# Patient Record
Sex: MALE | Race: OTHER | NOT HISPANIC OR LATINO
[De-identification: names, ages, dates, MRNs, and addresses within clinical notes are randomized per-mention and may not be internally consistent; named-entity substitution may affect disease eponyms.]

---

## 2021-12-04 ENCOUNTER — TRANSCRIPTION ENCOUNTER (OUTPATIENT)
Age: 81
End: 2021-12-04

## 2021-12-14 ENCOUNTER — INPATIENT (INPATIENT)
Facility: HOSPITAL | Age: 81
LOS: 5 days | Discharge: NURSING, REHAB OR LONG-TERM CARE | DRG: 185 | End: 2021-12-20
Attending: SURGERY | Admitting: SURGERY
Payer: MEDICARE

## 2021-12-14 VITALS
RESPIRATION RATE: 18 BRPM | HEIGHT: 71 IN | DIASTOLIC BLOOD PRESSURE: 76 MMHG | WEIGHT: 164.91 LBS | SYSTOLIC BLOOD PRESSURE: 145 MMHG | HEART RATE: 63 BPM | TEMPERATURE: 98 F | OXYGEN SATURATION: 98 %

## 2021-12-14 DIAGNOSIS — S22.42XA MULTIPLE FRACTURES OF RIBS, LEFT SIDE, INITIAL ENCOUNTER FOR CLOSED FRACTURE: ICD-10-CM

## 2021-12-14 LAB
ALBUMIN SERPL ELPH-MCNC: 4.1 G/DL — SIGNIFICANT CHANGE UP (ref 3.3–5.2)
ALP SERPL-CCNC: 38 U/L — LOW (ref 40–120)
ALT FLD-CCNC: 30 U/L — SIGNIFICANT CHANGE UP
ANION GAP SERPL CALC-SCNC: 14 MMOL/L — SIGNIFICANT CHANGE UP (ref 5–17)
APTT BLD: 29.9 SEC — SIGNIFICANT CHANGE UP (ref 27.5–35.5)
AST SERPL-CCNC: 18 U/L — SIGNIFICANT CHANGE UP
BASOPHILS # BLD AUTO: 0.03 K/UL — SIGNIFICANT CHANGE UP (ref 0–0.2)
BASOPHILS NFR BLD AUTO: 0.3 % — SIGNIFICANT CHANGE UP (ref 0–2)
BILIRUB SERPL-MCNC: 0.3 MG/DL — LOW (ref 0.4–2)
BUN SERPL-MCNC: 19.7 MG/DL — SIGNIFICANT CHANGE UP (ref 8–20)
CALCIUM SERPL-MCNC: 8.8 MG/DL — SIGNIFICANT CHANGE UP (ref 8.6–10.2)
CHLORIDE SERPL-SCNC: 102 MMOL/L — SIGNIFICANT CHANGE UP (ref 98–107)
CO2 SERPL-SCNC: 22 MMOL/L — SIGNIFICANT CHANGE UP (ref 22–29)
CREAT SERPL-MCNC: 0.78 MG/DL — SIGNIFICANT CHANGE UP (ref 0.5–1.3)
EOSINOPHIL # BLD AUTO: 0.1 K/UL — SIGNIFICANT CHANGE UP (ref 0–0.5)
EOSINOPHIL NFR BLD AUTO: 1.1 % — SIGNIFICANT CHANGE UP (ref 0–6)
GLUCOSE SERPL-MCNC: 90 MG/DL — SIGNIFICANT CHANGE UP (ref 70–99)
HCT VFR BLD CALC: 33.2 % — LOW (ref 39–50)
HGB BLD-MCNC: 11.5 G/DL — LOW (ref 13–17)
IMM GRANULOCYTES NFR BLD AUTO: 0.4 % — SIGNIFICANT CHANGE UP (ref 0–1.5)
INR BLD: 1.07 RATIO — SIGNIFICANT CHANGE UP (ref 0.88–1.16)
LIDOCAIN IGE QN: 15 U/L — LOW (ref 22–51)
LYMPHOCYTES # BLD AUTO: 0.57 K/UL — LOW (ref 1–3.3)
LYMPHOCYTES # BLD AUTO: 6.3 % — LOW (ref 13–44)
MAGNESIUM SERPL-MCNC: 1.7 MG/DL — SIGNIFICANT CHANGE UP (ref 1.6–2.6)
MCHC RBC-ENTMCNC: 31.4 PG — SIGNIFICANT CHANGE UP (ref 27–34)
MCHC RBC-ENTMCNC: 34.6 GM/DL — SIGNIFICANT CHANGE UP (ref 32–36)
MCV RBC AUTO: 90.7 FL — SIGNIFICANT CHANGE UP (ref 80–100)
MONOCYTES # BLD AUTO: 0.48 K/UL — SIGNIFICANT CHANGE UP (ref 0–0.9)
MONOCYTES NFR BLD AUTO: 5.3 % — SIGNIFICANT CHANGE UP (ref 2–14)
NEUTROPHILS # BLD AUTO: 7.78 K/UL — HIGH (ref 1.8–7.4)
NEUTROPHILS NFR BLD AUTO: 86.6 % — HIGH (ref 43–77)
PHOSPHATE SERPL-MCNC: 3.4 MG/DL — SIGNIFICANT CHANGE UP (ref 2.4–4.7)
PLATELET # BLD AUTO: 198 K/UL — SIGNIFICANT CHANGE UP (ref 150–400)
POTASSIUM SERPL-MCNC: 3.9 MMOL/L — SIGNIFICANT CHANGE UP (ref 3.5–5.3)
POTASSIUM SERPL-SCNC: 3.9 MMOL/L — SIGNIFICANT CHANGE UP (ref 3.5–5.3)
PROT SERPL-MCNC: 6.1 G/DL — LOW (ref 6.6–8.7)
PROTHROM AB SERPL-ACNC: 12.4 SEC — SIGNIFICANT CHANGE UP (ref 10.6–13.6)
RBC # BLD: 3.66 M/UL — LOW (ref 4.2–5.8)
RBC # FLD: 11.9 % — SIGNIFICANT CHANGE UP (ref 10.3–14.5)
SARS-COV-2 RNA SPEC QL NAA+PROBE: SIGNIFICANT CHANGE UP
SODIUM SERPL-SCNC: 138 MMOL/L — SIGNIFICANT CHANGE UP (ref 135–145)
WBC # BLD: 9 K/UL — SIGNIFICANT CHANGE UP (ref 3.8–10.5)
WBC # FLD AUTO: 9 K/UL — SIGNIFICANT CHANGE UP (ref 3.8–10.5)

## 2021-12-14 PROCEDURE — 70450 CT HEAD/BRAIN W/O DYE: CPT | Mod: 26,MA

## 2021-12-14 PROCEDURE — 71250 CT THORAX DX C-: CPT | Mod: 26,MA

## 2021-12-14 PROCEDURE — 99285 EMERGENCY DEPT VISIT HI MDM: CPT

## 2021-12-14 PROCEDURE — 99223 1ST HOSP IP/OBS HIGH 75: CPT

## 2021-12-14 PROCEDURE — 72128 CT CHEST SPINE W/O DYE: CPT | Mod: 26,MA

## 2021-12-14 PROCEDURE — 72125 CT NECK SPINE W/O DYE: CPT | Mod: 26,MA

## 2021-12-14 PROCEDURE — 93010 ELECTROCARDIOGRAM REPORT: CPT

## 2021-12-14 RX ORDER — PANTOPRAZOLE SODIUM 20 MG/1
40 TABLET, DELAYED RELEASE ORAL
Refills: 0 | Status: DISCONTINUED | OUTPATIENT
Start: 2021-12-14 | End: 2021-12-15

## 2021-12-14 RX ORDER — TRAMADOL HYDROCHLORIDE 50 MG/1
25 TABLET ORAL EVERY 4 HOURS
Refills: 0 | Status: DISCONTINUED | OUTPATIENT
Start: 2021-12-14 | End: 2021-12-20

## 2021-12-14 RX ORDER — TRAMADOL HYDROCHLORIDE 50 MG/1
25 TABLET ORAL EVERY 6 HOURS
Refills: 0 | Status: DISCONTINUED | OUTPATIENT
Start: 2021-12-14 | End: 2021-12-14

## 2021-12-14 RX ORDER — ENOXAPARIN SODIUM 100 MG/ML
40 INJECTION SUBCUTANEOUS DAILY
Refills: 0 | Status: DISCONTINUED | OUTPATIENT
Start: 2021-12-14 | End: 2021-12-20

## 2021-12-14 RX ORDER — OXYCODONE AND ACETAMINOPHEN 5; 325 MG/1; MG/1
1 TABLET ORAL ONCE
Refills: 0 | Status: DISCONTINUED | OUTPATIENT
Start: 2021-12-14 | End: 2021-12-14

## 2021-12-14 RX ORDER — TRAMADOL HYDROCHLORIDE 50 MG/1
50 TABLET ORAL EVERY 4 HOURS
Refills: 0 | Status: DISCONTINUED | OUTPATIENT
Start: 2021-12-14 | End: 2021-12-20

## 2021-12-14 RX ORDER — KETOROLAC TROMETHAMINE 30 MG/ML
15 SYRINGE (ML) INJECTION EVERY 6 HOURS
Refills: 0 | Status: DISCONTINUED | OUTPATIENT
Start: 2021-12-14 | End: 2021-12-16

## 2021-12-14 RX ORDER — AMITRIPTYLINE HCL 25 MG
75 TABLET ORAL AT BEDTIME
Refills: 0 | Status: DISCONTINUED | OUTPATIENT
Start: 2021-12-14 | End: 2021-12-20

## 2021-12-14 RX ORDER — METHOCARBAMOL 500 MG/1
500 TABLET, FILM COATED ORAL EVERY 6 HOURS
Refills: 0 | Status: DISCONTINUED | OUTPATIENT
Start: 2021-12-14 | End: 2021-12-20

## 2021-12-14 RX ORDER — MORPHINE SULFATE 50 MG/1
4 CAPSULE, EXTENDED RELEASE ORAL ONCE
Refills: 0 | Status: DISCONTINUED | OUTPATIENT
Start: 2021-12-14 | End: 2021-12-14

## 2021-12-14 RX ORDER — IBUPROFEN 200 MG
400 TABLET ORAL EVERY 8 HOURS
Refills: 0 | Status: DISCONTINUED | OUTPATIENT
Start: 2021-12-14 | End: 2021-12-14

## 2021-12-14 RX ORDER — AMLODIPINE BESYLATE 2.5 MG/1
5 TABLET ORAL DAILY
Refills: 0 | Status: DISCONTINUED | OUTPATIENT
Start: 2021-12-14 | End: 2021-12-20

## 2021-12-14 RX ORDER — ACETAMINOPHEN 500 MG
650 TABLET ORAL EVERY 6 HOURS
Refills: 0 | Status: DISCONTINUED | OUTPATIENT
Start: 2021-12-14 | End: 2021-12-20

## 2021-12-14 RX ORDER — GABAPENTIN 400 MG/1
300 CAPSULE ORAL EVERY 8 HOURS
Refills: 0 | Status: DISCONTINUED | OUTPATIENT
Start: 2021-12-14 | End: 2021-12-20

## 2021-12-14 RX ORDER — ACETAMINOPHEN 500 MG
1000 TABLET ORAL ONCE
Refills: 0 | Status: COMPLETED | OUTPATIENT
Start: 2021-12-14 | End: 2021-12-14

## 2021-12-14 RX ORDER — ONDANSETRON 8 MG/1
4 TABLET, FILM COATED ORAL EVERY 6 HOURS
Refills: 0 | Status: DISCONTINUED | OUTPATIENT
Start: 2021-12-14 | End: 2021-12-20

## 2021-12-14 RX ORDER — TRAMADOL HYDROCHLORIDE 50 MG/1
50 TABLET ORAL EVERY 6 HOURS
Refills: 0 | Status: DISCONTINUED | OUTPATIENT
Start: 2021-12-14 | End: 2021-12-14

## 2021-12-14 RX ORDER — LIDOCAINE 4 G/100G
1 CREAM TOPICAL DAILY
Refills: 0 | Status: DISCONTINUED | OUTPATIENT
Start: 2021-12-14 | End: 2021-12-20

## 2021-12-14 RX ADMIN — TRAMADOL HYDROCHLORIDE 50 MILLIGRAM(S): 50 TABLET ORAL at 22:52

## 2021-12-14 RX ADMIN — GABAPENTIN 300 MILLIGRAM(S): 400 CAPSULE ORAL at 21:58

## 2021-12-14 RX ADMIN — TRAMADOL HYDROCHLORIDE 50 MILLIGRAM(S): 50 TABLET ORAL at 20:01

## 2021-12-14 RX ADMIN — AMLODIPINE BESYLATE 5 MILLIGRAM(S): 2.5 TABLET ORAL at 17:41

## 2021-12-14 RX ADMIN — METHOCARBAMOL 500 MILLIGRAM(S): 500 TABLET, FILM COATED ORAL at 22:52

## 2021-12-14 RX ADMIN — Medication 400 MILLIGRAM(S): at 23:15

## 2021-12-14 RX ADMIN — PANTOPRAZOLE SODIUM 40 MILLIGRAM(S): 20 TABLET, DELAYED RELEASE ORAL at 17:39

## 2021-12-14 RX ADMIN — TRAMADOL HYDROCHLORIDE 50 MILLIGRAM(S): 50 TABLET ORAL at 23:16

## 2021-12-14 RX ADMIN — MORPHINE SULFATE 4 MILLIGRAM(S): 50 CAPSULE, EXTENDED RELEASE ORAL at 15:42

## 2021-12-14 RX ADMIN — Medication 75 MILLIGRAM(S): at 21:58

## 2021-12-14 RX ADMIN — MORPHINE SULFATE 4 MILLIGRAM(S): 50 CAPSULE, EXTENDED RELEASE ORAL at 14:00

## 2021-12-14 RX ADMIN — Medication 400 MILLIGRAM(S): at 15:57

## 2021-12-14 RX ADMIN — Medication 400 MILLIGRAM(S): at 21:59

## 2021-12-14 RX ADMIN — METHOCARBAMOL 500 MILLIGRAM(S): 500 TABLET, FILM COATED ORAL at 17:39

## 2021-12-14 RX ADMIN — ENOXAPARIN SODIUM 40 MILLIGRAM(S): 100 INJECTION SUBCUTANEOUS at 17:39

## 2021-12-14 RX ADMIN — LIDOCAINE 1 PATCH: 4 CREAM TOPICAL at 15:59

## 2021-12-14 RX ADMIN — OXYCODONE AND ACETAMINOPHEN 1 TABLET(S): 5; 325 TABLET ORAL at 15:42

## 2021-12-14 RX ADMIN — Medication 650 MILLIGRAM(S): at 23:15

## 2021-12-14 RX ADMIN — Medication 650 MILLIGRAM(S): at 21:58

## 2021-12-14 RX ADMIN — OXYCODONE AND ACETAMINOPHEN 1 TABLET(S): 5; 325 TABLET ORAL at 13:26

## 2021-12-14 NOTE — ED PROVIDER NOTE - CLINICAL SUMMARY MEDICAL DECISION MAKING FREE TEXT BOX
PT presenting after mechanical fall with +head injury and back pain. Will ct h/n/c/t-spine, pain meds, reeval.

## 2021-12-14 NOTE — ED PROVIDER NOTE - ATTENDING CONTRIBUTION TO CARE
82 y/o M pt with hx of  shunt presenting today s/p fall.  STates that he lost his balance httihng his head; denies any loc, nausea or vomiting; pe awake alert in  nad heent ncat neck supple cor s1s 2lungs clear abd soft nontender neuro nonfocal dx head injury

## 2021-12-14 NOTE — ED PROVIDER NOTE - PROGRESS NOTE DETAILS
acute fractures noted, additional pain meds given, trauma cx. WIll order abg Reviewed all results with pt as well as plans for admission. Pt is comfortable with plan for admission. Questions answered. - Antolin Horan, PGY-3

## 2021-12-14 NOTE — ED PROVIDER NOTE - PHYSICAL EXAMINATION
Gen: no acute distress  Head: normocephalic, small hematoma to L occiput, no lacerations or bleeding  Lung: CTAB, no respiratory distress, no wheezing, rales, rhonchi  CV: normal s1/s2, rrr,   Abd: soft, no tenderness, non-distended  MSK: No edema, no visible deformities, full range of motion in all 4 extremities, tenderness to L paraspinal thoracic back , no midline c/t/l spine tenderness  Neuro: No focal neurologic deficits  Skin: No rash   Psych: normal affect

## 2021-12-14 NOTE — ED PROVIDER NOTE - OBJECTIVE STATEMENT
80 y/o M pt with hx of  shunt presenting today s/p fall.  STates that he lost his balance turning to the left, fell hitting the back of his head. 82 y/o M pt with hx of  shunt presenting today s/p fall.  STates that he lost his balance turning to the left, fell hitting the back of his head. Denies loc, currently is c/o back pain. No ac use. Pt denies any chest pain, dyspnea, fevers, n/v/d, abdominal pain, dysuria, cough, congestion, sore throat, neck pain, weakness, numbness, tingling, dizziness, syncope, or other complaint.

## 2021-12-14 NOTE — H&P ADULT - HISTORY OF PRESENT ILLNESS
81y Male BIB BLS/ALS for GLF, patient with brain tumor presence of  shunt, dementia who presents s/p GLF, mechanical, landing on left side, per wife multiple falls recently      A airway intact, C spine cleared, speaking full sentences  B equal breath sounds bilaterally  C radial/DP/femoral pulses intact bilaterally   D GCS15 [id number], moving all fours, no focal deficits, pupils R 3mm reactive/L 3mm reactive  E patient fully exposed, no gross deformities or bleeding, provided warm blankets          Initial vitals:   HR 60 /80  Secondary survey remarkable for left chest tenderness    ROS: 10-system review is otherwise negative except HPI above.      PAST MEDICAL & SURGICAL HISTORY:    FAMILY HISTORY:    [] Family history not pertinent as reviewed with the patient and family    SOCIAL HISTORY:  ***    ALLERGIES: Allergy Status Unknown      HOME MEDICATIONS: ***  amlodipine  amitriptiline  --------------------------------------------------------------------------------------------    PHYSICAL EXAM: ***  General: NAD, Lying in bed comfortably  Neuro: A+Ox2 (baseline, date)  HEENT: NC/AT, EOMI  Neck: Soft, supple  Cardio: RRR, nml S1/S2  Resp: Good effort, CTA b/l  Thorax: left posterior chest wall tenderness  Breast: No lesions/masses, no drainage  GI/Abd: Soft, NT/ND, no rebound/guarding, no masses palpated  Vascular: All 4 extremities warm.  Skin: Intact, no breakdown  Lymphatic/Nodes: No palpable lymphadenopathy  Pelvis: stable  Musculoskeletal: All 4 extremities moving spontaneously, no limitations, no spinal tenderness or stepoffs  --------------------------------------------------------------------------------------------    LABS                 11.5   9.00   )----------(  198       ( 14 Dec 2021 16:17 )               33.2      138    |  102    |  19.7   ----------------------------<  90         ( 14 Dec 2021 16:17 )  3.9     |  22.0   |  0.78     Ca    8.8        ( 14 Dec 2021 16:17 )  Phos  3.4       ( 14 Dec 2021 16:17 )  Mg     1.7       ( 14 Dec 2021 16:17 )    TPro  6.1    /  Alb  4.1    /  TBili  0.3    /  DBili  x      /  AST  18     /  ALT  30     /  AlkPhos  38     ( 14 Dec 2021 16:17 )    LIVER FUNCTIONS - ( 14 Dec 2021 16:17 )  Alb: 4.1 g/dL / Pro: 6.1 g/dL / ALK PHOS: 38 U/L / ALT: 30 U/L / AST: 18 U/L / GGT: x           PT/INR -  12.4 sec / 1.07 ratio   ( 14 Dec 2021 16:17 )       PTT -  29.9 sec   ( 14 Dec 2021 16:17 )  CAPILLARY BLOOD GLUCOSE              --------------------------------------------------------------------------------------------  IMAGING  CT head: No acute injuries  CT C-spine: no acute injuries    CT C: L 6-7, 9 rib fractures  CT T-spine: no acute injuries      ASSESSMENT: Patient is a 81y old male s/p GLF, left isde RIb fracrtures PIC 7 (IS 2000, Pain 9, Cough strong) will admit to  for pain control, pic protocol    PLAN:    - Reg  - ambulae  - Lov  - PIC protocol  - Pain control  - Discussed with Dr. White    Called at [phone]  Seen [phone]

## 2021-12-14 NOTE — ED ADULT TRIAGE NOTE - CHIEF COMPLAINT QUOTE
pt a+ox3, BIBA s/p fall from standing height. reports losing balance and falling backward, hit head and upper back on end table. denies LOC. hx brain shunt, MD @ bedside.

## 2021-12-14 NOTE — H&P ADULT - ATTENDING COMMENTS
Patient seen and examined in ED. Pt c/o 7/10 pain. Able to pull 1700 on IS. Requested to leave, agreeable to stay overnight for observation for pain control.

## 2021-12-14 NOTE — ED ADULT NURSE NOTE - OBJECTIVE STATEMENT
A&Ox3. sitting up in stretcher  significant other at bedside   presents to ED s/p fall, pt reports he hit to left side of his head and has pain to left shoulder, denies LOC   pmhx shunt to right side of head  pt placed on cardiac monitor  awaiting further MD eval and disposition A&Ox3 with periods of forgetfulness. as per pt wife, pt has dementia. sitting up in stretcher  significant other at bedside   presents to ED s/p fall, pt reports he hit to left side of his head and has pain to left shoulder, denies LOC   pmhx shunt to right side of head  pt placed on cardiac monitor  awaiting further MD eval and disposition

## 2021-12-15 ENCOUNTER — TRANSCRIPTION ENCOUNTER (OUTPATIENT)
Age: 81
End: 2021-12-15

## 2021-12-15 PROBLEM — Z00.00 ENCOUNTER FOR PREVENTIVE HEALTH EXAMINATION: Status: ACTIVE | Noted: 2021-12-15

## 2021-12-15 LAB
ANION GAP SERPL CALC-SCNC: 14 MMOL/L — SIGNIFICANT CHANGE UP (ref 5–17)
BASOPHILS # BLD AUTO: 0.02 K/UL — SIGNIFICANT CHANGE UP (ref 0–0.2)
BASOPHILS NFR BLD AUTO: 0.5 % — SIGNIFICANT CHANGE UP (ref 0–2)
BUN SERPL-MCNC: 18.5 MG/DL — SIGNIFICANT CHANGE UP (ref 8–20)
CALCIUM SERPL-MCNC: 8.6 MG/DL — SIGNIFICANT CHANGE UP (ref 8.6–10.2)
CHLORIDE SERPL-SCNC: 103 MMOL/L — SIGNIFICANT CHANGE UP (ref 98–107)
CO2 SERPL-SCNC: 24 MMOL/L — SIGNIFICANT CHANGE UP (ref 22–29)
CREAT SERPL-MCNC: 0.79 MG/DL — SIGNIFICANT CHANGE UP (ref 0.5–1.3)
EOSINOPHIL # BLD AUTO: 0.41 K/UL — SIGNIFICANT CHANGE UP (ref 0–0.5)
EOSINOPHIL NFR BLD AUTO: 10.1 % — HIGH (ref 0–6)
GLUCOSE SERPL-MCNC: 98 MG/DL — SIGNIFICANT CHANGE UP (ref 70–99)
HCT VFR BLD CALC: 33.8 % — LOW (ref 39–50)
HGB BLD-MCNC: 11.7 G/DL — LOW (ref 13–17)
IMM GRANULOCYTES NFR BLD AUTO: 0.5 % — SIGNIFICANT CHANGE UP (ref 0–1.5)
LYMPHOCYTES # BLD AUTO: 0.72 K/UL — LOW (ref 1–3.3)
LYMPHOCYTES # BLD AUTO: 17.8 % — SIGNIFICANT CHANGE UP (ref 13–44)
MAGNESIUM SERPL-MCNC: 2 MG/DL — SIGNIFICANT CHANGE UP (ref 1.6–2.6)
MCHC RBC-ENTMCNC: 31.5 PG — SIGNIFICANT CHANGE UP (ref 27–34)
MCHC RBC-ENTMCNC: 34.6 GM/DL — SIGNIFICANT CHANGE UP (ref 32–36)
MCV RBC AUTO: 91.1 FL — SIGNIFICANT CHANGE UP (ref 80–100)
MONOCYTES # BLD AUTO: 0.33 K/UL — SIGNIFICANT CHANGE UP (ref 0–0.9)
MONOCYTES NFR BLD AUTO: 8.1 % — SIGNIFICANT CHANGE UP (ref 2–14)
NEUTROPHILS # BLD AUTO: 2.55 K/UL — SIGNIFICANT CHANGE UP (ref 1.8–7.4)
NEUTROPHILS NFR BLD AUTO: 63 % — SIGNIFICANT CHANGE UP (ref 43–77)
PHOSPHATE SERPL-MCNC: 4.8 MG/DL — HIGH (ref 2.4–4.7)
PLATELET # BLD AUTO: 180 K/UL — SIGNIFICANT CHANGE UP (ref 150–400)
POTASSIUM SERPL-MCNC: 3.6 MMOL/L — SIGNIFICANT CHANGE UP (ref 3.5–5.3)
POTASSIUM SERPL-SCNC: 3.6 MMOL/L — SIGNIFICANT CHANGE UP (ref 3.5–5.3)
RBC # BLD: 3.71 M/UL — LOW (ref 4.2–5.8)
RBC # FLD: 12.1 % — SIGNIFICANT CHANGE UP (ref 10.3–14.5)
SODIUM SERPL-SCNC: 140 MMOL/L — SIGNIFICANT CHANGE UP (ref 135–145)
WBC # BLD: 4.05 K/UL — SIGNIFICANT CHANGE UP (ref 3.8–10.5)
WBC # FLD AUTO: 4.05 K/UL — SIGNIFICANT CHANGE UP (ref 3.8–10.5)

## 2021-12-15 PROCEDURE — 99232 SBSQ HOSP IP/OBS MODERATE 35: CPT

## 2021-12-15 RX ORDER — ACETAMINOPHEN 500 MG
2 TABLET ORAL
Qty: 0 | Refills: 0 | DISCHARGE
Start: 2021-12-15

## 2021-12-15 RX ORDER — TRAMADOL HYDROCHLORIDE 50 MG/1
0.5 TABLET ORAL
Qty: 9 | Refills: 0
Start: 2021-12-15 | End: 2021-12-17

## 2021-12-15 RX ORDER — POTASSIUM CHLORIDE 20 MEQ
40 PACKET (EA) ORAL ONCE
Refills: 0 | Status: COMPLETED | OUTPATIENT
Start: 2021-12-15 | End: 2021-12-15

## 2021-12-15 RX ORDER — GABAPENTIN 400 MG/1
1 CAPSULE ORAL
Qty: 15 | Refills: 0
Start: 2021-12-15 | End: 2021-12-19

## 2021-12-15 RX ADMIN — Medication 40 MILLIEQUIVALENT(S): at 08:28

## 2021-12-15 RX ADMIN — Medication 15 MILLIGRAM(S): at 12:30

## 2021-12-15 RX ADMIN — PANTOPRAZOLE SODIUM 40 MILLIGRAM(S): 20 TABLET, DELAYED RELEASE ORAL at 06:13

## 2021-12-15 RX ADMIN — GABAPENTIN 300 MILLIGRAM(S): 400 CAPSULE ORAL at 12:04

## 2021-12-15 RX ADMIN — Medication 650 MILLIGRAM(S): at 12:30

## 2021-12-15 RX ADMIN — Medication 15 MILLIGRAM(S): at 12:02

## 2021-12-15 RX ADMIN — Medication 15 MILLIGRAM(S): at 06:31

## 2021-12-15 RX ADMIN — ENOXAPARIN SODIUM 40 MILLIGRAM(S): 100 INJECTION SUBCUTANEOUS at 12:02

## 2021-12-15 RX ADMIN — METHOCARBAMOL 500 MILLIGRAM(S): 500 TABLET, FILM COATED ORAL at 12:01

## 2021-12-15 RX ADMIN — GABAPENTIN 300 MILLIGRAM(S): 400 CAPSULE ORAL at 22:08

## 2021-12-15 RX ADMIN — GABAPENTIN 300 MILLIGRAM(S): 400 CAPSULE ORAL at 06:13

## 2021-12-15 RX ADMIN — METHOCARBAMOL 500 MILLIGRAM(S): 500 TABLET, FILM COATED ORAL at 18:15

## 2021-12-15 RX ADMIN — LIDOCAINE 1 PATCH: 4 CREAM TOPICAL at 12:02

## 2021-12-15 RX ADMIN — Medication 15 MILLIGRAM(S): at 03:35

## 2021-12-15 RX ADMIN — METHOCARBAMOL 500 MILLIGRAM(S): 500 TABLET, FILM COATED ORAL at 06:13

## 2021-12-15 RX ADMIN — Medication 650 MILLIGRAM(S): at 12:01

## 2021-12-15 RX ADMIN — Medication 15 MILLIGRAM(S): at 18:15

## 2021-12-15 RX ADMIN — Medication 15 MILLIGRAM(S): at 01:58

## 2021-12-15 RX ADMIN — Medication 650 MILLIGRAM(S): at 08:26

## 2021-12-15 RX ADMIN — Medication 650 MILLIGRAM(S): at 22:07

## 2021-12-15 RX ADMIN — AMLODIPINE BESYLATE 5 MILLIGRAM(S): 2.5 TABLET ORAL at 06:13

## 2021-12-15 RX ADMIN — LIDOCAINE 1 PATCH: 4 CREAM TOPICAL at 03:35

## 2021-12-15 RX ADMIN — Medication 15 MILLIGRAM(S): at 06:13

## 2021-12-15 NOTE — PROGRESS NOTE ADULT - ASSESSMENT
Patient is an 80 y/o M s/p ground level fall with multiple left sided rib fractures  plan:  pic protocol  pain control  pulmonary toliet  incentive spirometer  continue home meds  regular diet  if patient continues to have increased pain with movement consider rib block

## 2021-12-15 NOTE — DISCHARGE NOTE PROVIDER - NSDCMRMEDTOKEN_GEN_ALL_CORE_FT
acetaminophen 325 mg oral tablet: 2 tab(s) orally every 6 hours  amitriptyline 75 mg oral tablet: 1 tab(s) orally once a day (at bedtime)  amLODIPine 5 mg oral tablet: 1 tab(s) orally once a day  gabapentin 300 mg oral capsule: 1 cap(s) orally every 8 hours  traMADol 50 mg oral tablet: 0.5 tab(s) orally every 4 hours, As needed, Moderate Pain (4 - 6) MDD:3 tabs   acetaminophen 325 mg oral tablet: 2 tab(s) orally every 6 hours  amitriptyline 75 mg oral tablet: 1 tab(s) orally once a day (at bedtime)  amLODIPine 5 mg oral tablet: 1 tab(s) orally once a day  gabapentin 300 mg oral capsule: 1 cap(s) orally every 8 hours  polyethylene glycol 3350 oral powder for reconstitution: 17 gram(s) orally 2 times a day  senna oral tablet: 2 tab(s) orally once a day (at bedtime)  sertraline 100 mg oral tablet: 2 tab(s) orally once a day in the morning  traMADol 50 mg oral tablet: 0.5 tab(s) orally every 4 hours, As needed, Moderate Pain (4 - 6) MDD:3 tabs

## 2021-12-15 NOTE — DISCHARGE NOTE NURSING/CASE MANAGEMENT/SOCIAL WORK - PATIENT PORTAL LINK FT
You can access the FollowMyHealth Patient Portal offered by [facility] by registering at the following website: [email or web address]. By joining "[employer]’s FollowMyHealth portal, you will also be able to view your health information using other applications (apps) compatible with our system.

## 2021-12-15 NOTE — DISCHARGE NOTE PROVIDER - NSDCCPCAREPLAN_GEN_ALL_CORE_FT
PRINCIPAL DISCHARGE DIAGNOSIS  Diagnosis: Multiple fractures of ribs of left side  Assessment and Plan of Treatment: Follow Up: Please call to make an appointment with your primary care physician as per your usual schedule.   Activity: May return to normal activities as tolerated, however refrain from heavy lifting >10-15 pounds. Continue to use incentive spirometer 10 times per hour to ensure adequate lung expansion.   Diet: May continue regular diet.  Medications: Please take all home medications as prescribed by your primary care doctor. Pain medication has been prescribed for you. Please take it as prescribed, do not drive or operate heavy machinery while taking narcotics. You are encouraged to take over-the-counter tylenol and/or ibuprofen for pain relief. 4% Lidocaine patches may be purchased over the counter for additional pain relief if needed.   Patient is advised to RETURN TO THE EMERGENCY DEPARTMENT for any of the following - worsening pain, fever/chills, nausea/vomiting, alterned mental status, chest pain, shortness of breath, or any other new/worsening symptoms.

## 2021-12-15 NOTE — DISCHARGE NOTE PROVIDER - NSFOLLOWUPCLINICS_GEN_ALL_ED_FT
[facility] Acute Care Surgery  Acute Care Surgery  [address] [place] [id number]  Phone: [phone]  Fax:

## 2021-12-15 NOTE — PHYSICAL THERAPY INITIAL EVALUATION ADULT - PERTINENT HX OF CURRENT PROBLEM, REHAB EVAL
81y Male BIB BLS/ALS for GLF, patient with brain tumor presence of  shunt, dementia who presents s/p GLF, mechanical, landing on left side, per wife multiple falls recently

## 2021-12-15 NOTE — DISCHARGE NOTE PROVIDER - HOSPITAL COURSE
HPI: 81y Male BIB BLS/ALS for GLF, patient with brain tumor presence of  shunt, dementia who presents s/p GLF, mechanical, landing on left side, per wife multiple falls recently.    Hospital Course: Imaging in ED showed traumatic injuries significant for Left 6, 7 and 9th rib fractures. Patient was admitted to the trauma service under Dr. Aggarwal for pain control. pic protocol was initiated with multi-modal pain regimen. pic score on admission was 7 (strong cough, 1500 IS, pain 9). Patient's pain improved with medications. Ambulatory without assistance. Voiding spontaneously. Tolerating regular diet well. Pain was well controlled at time of discharge. Patient was stable for discharge home with wife.      Length of time preparing discharge > 30 minutes   HPI: 81y Male BIB BLS/ALS for GLF, patient with brain tumor presence of  shunt, dementia who presents s/p GLF, mechanical, landing on left side, per wife multiple falls recently.    Hospital Course: Imaging in ED showed traumatic injuries significant for Left 6, 7 and 9th rib fractures. Patient was admitted to the trauma service under Dr. Aggarwal for pain control. pic protocol was initiated with multi-modal pain regimen. pic score on admission was 7 (strong cough, 1500 IS, pain 9). Patient's pain improved with medications. Ambulatory without assistance. Voiding spontaneously. Tolerating regular diet well. Pain was well controlled at time of discharge. Patient was stable for discharge home with wife.   Patient received pain blocked due to rib fractures. This procedure was done by pain management 12/20/21.

## 2021-12-15 NOTE — PHYSICAL THERAPY INITIAL EVALUATION ADULT - ADDITIONAL COMMENTS
pt states he lives with his wife in a 1st floor apartment with no steps to enter and none inside. pt has RW he uses at times.

## 2021-12-15 NOTE — DISCHARGE NOTE NURSING/CASE MANAGEMENT/SOCIAL WORK - NSDCPEFALRISK_GEN_ALL_CORE
For information on Fall & Injury Prevention, visit: [email or web address] OR  [email or web address] OR  https://www.cdc.gov/steadi/patient.html

## 2021-12-15 NOTE — DISCHARGE NOTE PROVIDER - NSDCCPTREATMENT_GEN_ALL_CORE_FT
PRINCIPAL PROCEDURE  Procedure: Block, nerve, intercostal  Findings and Treatment: Done by pain management on th ele chest

## 2021-12-16 PROCEDURE — 99232 SBSQ HOSP IP/OBS MODERATE 35: CPT

## 2021-12-16 RX ORDER — INFLUENZA VIRUS VACCINE 15; 15; 15; 15 UG/.5ML; UG/.5ML; UG/.5ML; UG/.5ML
0.7 SUSPENSION INTRAMUSCULAR ONCE
Refills: 0 | Status: DISCONTINUED | OUTPATIENT
Start: 2021-12-16 | End: 2021-12-20

## 2021-12-16 RX ADMIN — METHOCARBAMOL 500 MILLIGRAM(S): 500 TABLET, FILM COATED ORAL at 22:28

## 2021-12-16 RX ADMIN — Medication 15 MILLIGRAM(S): at 06:31

## 2021-12-16 RX ADMIN — LIDOCAINE 1 PATCH: 4 CREAM TOPICAL at 00:00

## 2021-12-16 RX ADMIN — LIDOCAINE 1 PATCH: 4 CREAM TOPICAL at 15:13

## 2021-12-16 RX ADMIN — METHOCARBAMOL 500 MILLIGRAM(S): 500 TABLET, FILM COATED ORAL at 06:31

## 2021-12-16 RX ADMIN — Medication 650 MILLIGRAM(S): at 04:12

## 2021-12-16 RX ADMIN — TRAMADOL HYDROCHLORIDE 50 MILLIGRAM(S): 50 TABLET ORAL at 21:34

## 2021-12-16 RX ADMIN — Medication 15 MILLIGRAM(S): at 01:30

## 2021-12-16 RX ADMIN — Medication 650 MILLIGRAM(S): at 05:02

## 2021-12-16 RX ADMIN — Medication 15 MILLIGRAM(S): at 07:04

## 2021-12-16 RX ADMIN — TRAMADOL HYDROCHLORIDE 50 MILLIGRAM(S): 50 TABLET ORAL at 15:17

## 2021-12-16 RX ADMIN — METHOCARBAMOL 500 MILLIGRAM(S): 500 TABLET, FILM COATED ORAL at 17:51

## 2021-12-16 RX ADMIN — GABAPENTIN 300 MILLIGRAM(S): 400 CAPSULE ORAL at 15:13

## 2021-12-16 RX ADMIN — Medication 650 MILLIGRAM(S): at 16:20

## 2021-12-16 RX ADMIN — Medication 650 MILLIGRAM(S): at 20:34

## 2021-12-16 RX ADMIN — Medication 650 MILLIGRAM(S): at 21:34

## 2021-12-16 RX ADMIN — GABAPENTIN 300 MILLIGRAM(S): 400 CAPSULE ORAL at 20:35

## 2021-12-16 RX ADMIN — ENOXAPARIN SODIUM 40 MILLIGRAM(S): 100 INJECTION SUBCUTANEOUS at 12:34

## 2021-12-16 RX ADMIN — AMLODIPINE BESYLATE 5 MILLIGRAM(S): 2.5 TABLET ORAL at 06:30

## 2021-12-16 RX ADMIN — Medication 650 MILLIGRAM(S): at 10:31

## 2021-12-16 RX ADMIN — TRAMADOL HYDROCHLORIDE 50 MILLIGRAM(S): 50 TABLET ORAL at 16:15

## 2021-12-16 RX ADMIN — Medication 75 MILLIGRAM(S): at 22:27

## 2021-12-16 RX ADMIN — Medication 650 MILLIGRAM(S): at 09:34

## 2021-12-16 RX ADMIN — METHOCARBAMOL 500 MILLIGRAM(S): 500 TABLET, FILM COATED ORAL at 01:05

## 2021-12-16 RX ADMIN — Medication 650 MILLIGRAM(S): at 15:24

## 2021-12-16 RX ADMIN — GABAPENTIN 300 MILLIGRAM(S): 400 CAPSULE ORAL at 06:31

## 2021-12-16 RX ADMIN — Medication 75 MILLIGRAM(S): at 01:05

## 2021-12-16 RX ADMIN — TRAMADOL HYDROCHLORIDE 50 MILLIGRAM(S): 50 TABLET ORAL at 20:34

## 2021-12-16 RX ADMIN — LIDOCAINE 1 PATCH: 4 CREAM TOPICAL at 18:19

## 2021-12-16 RX ADMIN — Medication 15 MILLIGRAM(S): at 01:08

## 2021-12-16 RX ADMIN — METHOCARBAMOL 500 MILLIGRAM(S): 500 TABLET, FILM COATED ORAL at 12:34

## 2021-12-16 NOTE — PATIENT PROFILE ADULT - FALL HARM RISK - HARM RISK INTERVENTIONS

## 2021-12-16 NOTE — PROGRESS NOTE ADULT - ASSESSMENT
Patient is an 80 y/o M s/p ground level fall with multiple left sided rib fractures  plan:  pic protocol  pain control  pulmonary toliet  incentive spirometer  continue home meds  regular diet  if patient continues to have increased pain with movement consider rib block   patient seen by PT for unsteady needs more work for dispo

## 2021-12-17 PROCEDURE — 99232 SBSQ HOSP IP/OBS MODERATE 35: CPT

## 2021-12-17 RX ORDER — DONEPEZIL HYDROCHLORIDE 10 MG/1
10 TABLET, FILM COATED ORAL DAILY
Refills: 0 | Status: DISCONTINUED | OUTPATIENT
Start: 2021-12-17 | End: 2021-12-20

## 2021-12-17 RX ORDER — MEMANTINE HYDROCHLORIDE 10 MG/1
20 TABLET ORAL
Refills: 0 | Status: DISCONTINUED | OUTPATIENT
Start: 2021-12-18 | End: 2021-12-20

## 2021-12-17 RX ADMIN — Medication 650 MILLIGRAM(S): at 21:16

## 2021-12-17 RX ADMIN — TRAMADOL HYDROCHLORIDE 25 MILLIGRAM(S): 50 TABLET ORAL at 04:00

## 2021-12-17 RX ADMIN — LIDOCAINE 1 PATCH: 4 CREAM TOPICAL at 23:42

## 2021-12-17 RX ADMIN — METHOCARBAMOL 500 MILLIGRAM(S): 500 TABLET, FILM COATED ORAL at 05:15

## 2021-12-17 RX ADMIN — Medication 650 MILLIGRAM(S): at 17:17

## 2021-12-17 RX ADMIN — TRAMADOL HYDROCHLORIDE 50 MILLIGRAM(S): 50 TABLET ORAL at 20:00

## 2021-12-17 RX ADMIN — LIDOCAINE 1 PATCH: 4 CREAM TOPICAL at 12:25

## 2021-12-17 RX ADMIN — Medication 650 MILLIGRAM(S): at 10:50

## 2021-12-17 RX ADMIN — TRAMADOL HYDROCHLORIDE 50 MILLIGRAM(S): 50 TABLET ORAL at 10:50

## 2021-12-17 RX ADMIN — ENOXAPARIN SODIUM 40 MILLIGRAM(S): 100 INJECTION SUBCUTANEOUS at 12:25

## 2021-12-17 RX ADMIN — Medication 650 MILLIGRAM(S): at 09:53

## 2021-12-17 RX ADMIN — METHOCARBAMOL 500 MILLIGRAM(S): 500 TABLET, FILM COATED ORAL at 18:13

## 2021-12-17 RX ADMIN — TRAMADOL HYDROCHLORIDE 50 MILLIGRAM(S): 50 TABLET ORAL at 11:50

## 2021-12-17 RX ADMIN — METHOCARBAMOL 500 MILLIGRAM(S): 500 TABLET, FILM COATED ORAL at 12:26

## 2021-12-17 RX ADMIN — TRAMADOL HYDROCHLORIDE 25 MILLIGRAM(S): 50 TABLET ORAL at 03:25

## 2021-12-17 RX ADMIN — LIDOCAINE 1 PATCH: 4 CREAM TOPICAL at 03:34

## 2021-12-17 RX ADMIN — LIDOCAINE 1 PATCH: 4 CREAM TOPICAL at 18:18

## 2021-12-17 RX ADMIN — Medication 650 MILLIGRAM(S): at 06:00

## 2021-12-17 RX ADMIN — GABAPENTIN 300 MILLIGRAM(S): 400 CAPSULE ORAL at 21:15

## 2021-12-17 RX ADMIN — GABAPENTIN 300 MILLIGRAM(S): 400 CAPSULE ORAL at 14:48

## 2021-12-17 RX ADMIN — METHOCARBAMOL 500 MILLIGRAM(S): 500 TABLET, FILM COATED ORAL at 22:59

## 2021-12-17 RX ADMIN — AMLODIPINE BESYLATE 5 MILLIGRAM(S): 2.5 TABLET ORAL at 05:14

## 2021-12-17 RX ADMIN — Medication 650 MILLIGRAM(S): at 22:12

## 2021-12-17 RX ADMIN — TRAMADOL HYDROCHLORIDE 50 MILLIGRAM(S): 50 TABLET ORAL at 07:30

## 2021-12-17 RX ADMIN — Medication 75 MILLIGRAM(S): at 21:15

## 2021-12-17 RX ADMIN — GABAPENTIN 300 MILLIGRAM(S): 400 CAPSULE ORAL at 05:14

## 2021-12-17 RX ADMIN — TRAMADOL HYDROCHLORIDE 50 MILLIGRAM(S): 50 TABLET ORAL at 19:11

## 2021-12-17 RX ADMIN — TRAMADOL HYDROCHLORIDE 50 MILLIGRAM(S): 50 TABLET ORAL at 06:43

## 2021-12-17 RX ADMIN — Medication 650 MILLIGRAM(S): at 05:14

## 2021-12-17 RX ADMIN — Medication 650 MILLIGRAM(S): at 16:18

## 2021-12-17 NOTE — PROGRESS NOTE ADULT - ASSESSMENT
Patient is an 80 y/o M s/p ground level fall with multiple left sided rib fractures    Plan:  PIC protocol  Pain control  Pulmonary toliet  Incentive spirometer  Continue home meds  Regular diet  PT rec MENDEZ

## 2021-12-18 PROCEDURE — 99231 SBSQ HOSP IP/OBS SF/LOW 25: CPT | Mod: GC

## 2021-12-18 RX ORDER — SERTRALINE 25 MG/1
200 TABLET, FILM COATED ORAL DAILY
Refills: 0 | Status: DISCONTINUED | OUTPATIENT
Start: 2021-12-18 | End: 2021-12-20

## 2021-12-18 RX ADMIN — SERTRALINE 200 MILLIGRAM(S): 25 TABLET, FILM COATED ORAL at 11:37

## 2021-12-18 RX ADMIN — LIDOCAINE 1 PATCH: 4 CREAM TOPICAL at 13:06

## 2021-12-18 RX ADMIN — ENOXAPARIN SODIUM 40 MILLIGRAM(S): 100 INJECTION SUBCUTANEOUS at 11:37

## 2021-12-18 RX ADMIN — Medication 650 MILLIGRAM(S): at 12:30

## 2021-12-18 RX ADMIN — GABAPENTIN 300 MILLIGRAM(S): 400 CAPSULE ORAL at 05:17

## 2021-12-18 RX ADMIN — TRAMADOL HYDROCHLORIDE 50 MILLIGRAM(S): 50 TABLET ORAL at 18:53

## 2021-12-18 RX ADMIN — LIDOCAINE 1 PATCH: 4 CREAM TOPICAL at 21:38

## 2021-12-18 RX ADMIN — METHOCARBAMOL 500 MILLIGRAM(S): 500 TABLET, FILM COATED ORAL at 11:38

## 2021-12-18 RX ADMIN — Medication 650 MILLIGRAM(S): at 17:35

## 2021-12-18 RX ADMIN — Medication 75 MILLIGRAM(S): at 21:39

## 2021-12-18 RX ADMIN — GABAPENTIN 300 MILLIGRAM(S): 400 CAPSULE ORAL at 21:39

## 2021-12-18 RX ADMIN — TRAMADOL HYDROCHLORIDE 50 MILLIGRAM(S): 50 TABLET ORAL at 19:50

## 2021-12-18 RX ADMIN — Medication 650 MILLIGRAM(S): at 06:00

## 2021-12-18 RX ADMIN — AMLODIPINE BESYLATE 5 MILLIGRAM(S): 2.5 TABLET ORAL at 05:17

## 2021-12-18 RX ADMIN — Medication 650 MILLIGRAM(S): at 18:30

## 2021-12-18 RX ADMIN — MEMANTINE HYDROCHLORIDE 20 MILLIGRAM(S): 10 TABLET ORAL at 11:37

## 2021-12-18 RX ADMIN — Medication 650 MILLIGRAM(S): at 05:17

## 2021-12-18 RX ADMIN — Medication 650 MILLIGRAM(S): at 22:38

## 2021-12-18 RX ADMIN — METHOCARBAMOL 500 MILLIGRAM(S): 500 TABLET, FILM COATED ORAL at 22:38

## 2021-12-18 RX ADMIN — Medication 650 MILLIGRAM(S): at 22:39

## 2021-12-18 RX ADMIN — METHOCARBAMOL 500 MILLIGRAM(S): 500 TABLET, FILM COATED ORAL at 17:35

## 2021-12-18 RX ADMIN — Medication 650 MILLIGRAM(S): at 11:36

## 2021-12-18 RX ADMIN — METHOCARBAMOL 500 MILLIGRAM(S): 500 TABLET, FILM COATED ORAL at 05:17

## 2021-12-18 RX ADMIN — DONEPEZIL HYDROCHLORIDE 10 MILLIGRAM(S): 10 TABLET, FILM COATED ORAL at 11:37

## 2021-12-18 RX ADMIN — GABAPENTIN 300 MILLIGRAM(S): 400 CAPSULE ORAL at 14:57

## 2021-12-18 NOTE — PROGRESS NOTE ADULT - ASSESSMENT
Patient is an 82 y/o M s/p ground level fall with multiple left sided rib fractures    Plan:  PIC protocol  Pain control  Pulmonary toliet  Incentive spirometer  Continue home meds  Regular diet  PT rec MENDEZ Patient is an 82 y/o M s/p ground level fall with multiple left sided rib fractures    Plan:  PIC protocol  Pain control  Pulmonary toliet  Incentive spirometer  Continue home meds; added home Sertaline 200mg to AM regimen  Regular diet  PT rec MENDEZ

## 2021-12-19 LAB — SARS-COV-2 RNA SPEC QL NAA+PROBE: SIGNIFICANT CHANGE UP

## 2021-12-19 PROCEDURE — 99231 SBSQ HOSP IP/OBS SF/LOW 25: CPT | Mod: GC

## 2021-12-19 RX ADMIN — Medication 650 MILLIGRAM(S): at 18:28

## 2021-12-19 RX ADMIN — Medication 650 MILLIGRAM(S): at 05:09

## 2021-12-19 RX ADMIN — Medication 650 MILLIGRAM(S): at 13:37

## 2021-12-19 RX ADMIN — METHOCARBAMOL 500 MILLIGRAM(S): 500 TABLET, FILM COATED ORAL at 18:28

## 2021-12-19 RX ADMIN — Medication 650 MILLIGRAM(S): at 05:08

## 2021-12-19 RX ADMIN — METHOCARBAMOL 500 MILLIGRAM(S): 500 TABLET, FILM COATED ORAL at 13:27

## 2021-12-19 RX ADMIN — Medication 75 MILLIGRAM(S): at 21:30

## 2021-12-19 RX ADMIN — METHOCARBAMOL 500 MILLIGRAM(S): 500 TABLET, FILM COATED ORAL at 23:07

## 2021-12-19 RX ADMIN — MEMANTINE HYDROCHLORIDE 20 MILLIGRAM(S): 10 TABLET ORAL at 13:26

## 2021-12-19 RX ADMIN — Medication 650 MILLIGRAM(S): at 13:28

## 2021-12-19 RX ADMIN — Medication 650 MILLIGRAM(S): at 23:08

## 2021-12-19 RX ADMIN — AMLODIPINE BESYLATE 5 MILLIGRAM(S): 2.5 TABLET ORAL at 05:08

## 2021-12-19 RX ADMIN — GABAPENTIN 300 MILLIGRAM(S): 400 CAPSULE ORAL at 05:08

## 2021-12-19 RX ADMIN — TRAMADOL HYDROCHLORIDE 25 MILLIGRAM(S): 50 TABLET ORAL at 14:25

## 2021-12-19 RX ADMIN — TRAMADOL HYDROCHLORIDE 25 MILLIGRAM(S): 50 TABLET ORAL at 13:27

## 2021-12-19 RX ADMIN — GABAPENTIN 300 MILLIGRAM(S): 400 CAPSULE ORAL at 13:38

## 2021-12-19 RX ADMIN — LIDOCAINE 1 PATCH: 4 CREAM TOPICAL at 05:09

## 2021-12-19 RX ADMIN — TRAMADOL HYDROCHLORIDE 50 MILLIGRAM(S): 50 TABLET ORAL at 18:34

## 2021-12-19 RX ADMIN — Medication 650 MILLIGRAM(S): at 23:09

## 2021-12-19 RX ADMIN — GABAPENTIN 300 MILLIGRAM(S): 400 CAPSULE ORAL at 21:31

## 2021-12-19 RX ADMIN — ENOXAPARIN SODIUM 40 MILLIGRAM(S): 100 INJECTION SUBCUTANEOUS at 13:25

## 2021-12-19 RX ADMIN — DONEPEZIL HYDROCHLORIDE 10 MILLIGRAM(S): 10 TABLET, FILM COATED ORAL at 13:26

## 2021-12-19 RX ADMIN — METHOCARBAMOL 500 MILLIGRAM(S): 500 TABLET, FILM COATED ORAL at 05:09

## 2021-12-19 RX ADMIN — SERTRALINE 200 MILLIGRAM(S): 25 TABLET, FILM COATED ORAL at 13:27

## 2021-12-19 NOTE — PROGRESS NOTE ADULT - ASSESSMENT
81M s/p ground level fall with multiple left sided rib fractures    Plan:  PIC protocol  Pain control  Pulmonary toliet  Incentive spirometer  Continue home meds  for MENDEZ placement

## 2021-12-19 NOTE — DIETITIAN INITIAL EVALUATION ADULT. - ORAL INTAKE PTA/DIET HISTORY
Pt reports poor po intake since admission with #. Discussed high fiber to assistant with constipation.

## 2021-12-20 VITALS — OXYGEN SATURATION: 96 %

## 2021-12-20 PROCEDURE — 99232 SBSQ HOSP IP/OBS MODERATE 35: CPT

## 2021-12-20 RX ORDER — SENNA PLUS 8.6 MG/1
2 TABLET ORAL AT BEDTIME
Refills: 0 | Status: DISCONTINUED | OUTPATIENT
Start: 2021-12-20 | End: 2021-12-20

## 2021-12-20 RX ORDER — SENNA PLUS 8.6 MG/1
2 TABLET ORAL
Qty: 0 | Refills: 0 | DISCHARGE
Start: 2021-12-20

## 2021-12-20 RX ORDER — POLYETHYLENE GLYCOL 3350 17 G/17G
17 POWDER, FOR SOLUTION ORAL
Qty: 0 | Refills: 0 | DISCHARGE
Start: 2021-12-20

## 2021-12-20 RX ORDER — POLYETHYLENE GLYCOL 3350 17 G/17G
17 POWDER, FOR SOLUTION ORAL
Refills: 0 | Status: DISCONTINUED | OUTPATIENT
Start: 2021-12-20 | End: 2021-12-20

## 2021-12-20 RX ORDER — PSYLLIUM SEED (WITH DEXTROSE)
1 POWDER (GRAM) ORAL AT BEDTIME
Refills: 0 | Status: DISCONTINUED | OUTPATIENT
Start: 2021-12-20 | End: 2021-12-20

## 2021-12-20 RX ADMIN — Medication 650 MILLIGRAM(S): at 12:39

## 2021-12-20 RX ADMIN — TRAMADOL HYDROCHLORIDE 50 MILLIGRAM(S): 50 TABLET ORAL at 10:20

## 2021-12-20 RX ADMIN — TRAMADOL HYDROCHLORIDE 50 MILLIGRAM(S): 50 TABLET ORAL at 06:15

## 2021-12-20 RX ADMIN — SERTRALINE 200 MILLIGRAM(S): 25 TABLET, FILM COATED ORAL at 12:39

## 2021-12-20 RX ADMIN — MEMANTINE HYDROCHLORIDE 20 MILLIGRAM(S): 10 TABLET ORAL at 09:54

## 2021-12-20 RX ADMIN — TRAMADOL HYDROCHLORIDE 50 MILLIGRAM(S): 50 TABLET ORAL at 16:00

## 2021-12-20 RX ADMIN — METHOCARBAMOL 500 MILLIGRAM(S): 500 TABLET, FILM COATED ORAL at 05:18

## 2021-12-20 RX ADMIN — Medication 650 MILLIGRAM(S): at 05:15

## 2021-12-20 RX ADMIN — GABAPENTIN 300 MILLIGRAM(S): 400 CAPSULE ORAL at 13:17

## 2021-12-20 RX ADMIN — TRAMADOL HYDROCHLORIDE 50 MILLIGRAM(S): 50 TABLET ORAL at 05:15

## 2021-12-20 RX ADMIN — Medication 650 MILLIGRAM(S): at 13:15

## 2021-12-20 RX ADMIN — GABAPENTIN 300 MILLIGRAM(S): 400 CAPSULE ORAL at 05:15

## 2021-12-20 RX ADMIN — Medication 650 MILLIGRAM(S): at 05:19

## 2021-12-20 RX ADMIN — TRAMADOL HYDROCHLORIDE 50 MILLIGRAM(S): 50 TABLET ORAL at 09:55

## 2021-12-20 RX ADMIN — ENOXAPARIN SODIUM 40 MILLIGRAM(S): 100 INJECTION SUBCUTANEOUS at 12:40

## 2021-12-20 RX ADMIN — AMLODIPINE BESYLATE 5 MILLIGRAM(S): 2.5 TABLET ORAL at 05:17

## 2021-12-20 RX ADMIN — TRAMADOL HYDROCHLORIDE 50 MILLIGRAM(S): 50 TABLET ORAL at 15:19

## 2021-12-20 RX ADMIN — METHOCARBAMOL 500 MILLIGRAM(S): 500 TABLET, FILM COATED ORAL at 13:17

## 2021-12-20 RX ADMIN — DONEPEZIL HYDROCHLORIDE 10 MILLIGRAM(S): 10 TABLET, FILM COATED ORAL at 09:56

## 2021-12-20 NOTE — PROCEDURE NOTE - ADDITIONAL PROCEDURE DETAILS
Using the lower border of scapula as a landmark for T7, the T7 spinous process was located. After sterile prep and gloves were don, an ultrasound probe was used to visualize the T7 transverse process. A 22G 80mm Pajunk needle was advanced in-plane and 15cc of Exparel + 15cc of Bupivacaine 0.25% was administered into the CHANDRAKANT. Aspiration q5ccs was negative for heme/csf/air. Local anesthetic spread was visualized on ultrasound.

## 2021-12-20 NOTE — PROGRESS NOTE ADULT - SUBJECTIVE AND OBJECTIVE BOX
INTERVAL HPI/OVERNIGHT EVENTS: sleeping when seen, no overnight events per bedside RN       MEDICATIONS  (STANDING):  acetaminophen     Tablet .. 650 milliGRAM(s) Oral every 6 hours  amitriptyline Oral Tab/Cap - Peds 75 milliGRAM(s) Oral at bedtime  amLODIPine   Tablet 5 milliGRAM(s) Oral daily  donepezil 10 milliGRAM(s) Oral daily  enoxaparin Injectable 40 milliGRAM(s) SubCutaneous daily  gabapentin 300 milliGRAM(s) Oral every 8 hours  influenza  Vaccine (HIGH DOSE) 0.7 milliLiter(s) IntraMuscular once  lidocaine   4% Patch 1 Patch Transdermal daily  memantine 20 milliGRAM(s) Oral <User Schedule>  methocarbamol 500 milliGRAM(s) Oral every 6 hours  sertraline 200 milliGRAM(s) Oral daily    MEDICATIONS  (PRN):  ondansetron Injectable 4 milliGRAM(s) IV Push every 6 hours PRN Nausea  traMADol 25 milliGRAM(s) Oral every 4 hours PRN Moderate Pain (4 - 6)  traMADol 50 milliGRAM(s) Oral every 4 hours PRN Severe Pain (7 - 10)      Vital Signs Last 24 Hrs  T(C): 36.5 (18 Dec 2021 23:12), Max: 36.8 (18 Dec 2021 04:51)  T(F): 97.7 (18 Dec 2021 23:12), Max: 98.3 (18 Dec 2021 04:51)  HR: 83 (18 Dec 2021 23:12) (79 - 94)  BP: 144/84 (18 Dec 2021 23:12) (114/83 - 155/82)  BP(mean): --  RR: 18 (18 Dec 2021 23:12) (18 - 18)  SpO2: 94% (18 Dec 2021 23:12) (94% - 96%)    PHYSICAL EXAM:      Constitutional: NAD    Respiratory: no accessory muscle use     Cardiovascular: RRR    Gastrointestinal: soft, NT/ND          I&O's Detail    18 Dec 2021 07:01  -  19 Dec 2021 04:22  --------------------------------------------------------  IN:    Oral Fluid: 360 mL  Total IN: 360 mL    OUT:  Total OUT: 0 mL    Total NET: 360 mL          LABS:                RADIOLOGY & ADDITIONAL STUDIES:
SUBJECTIVE/24 hour events:  Patient is a 81yMale s/p GLF sustaining multiple rib fractures. Patient with no acute events overnight, pic score 8, pain controlled on current regimen, continues pulling 2liters on incentive spirometer. Patient awaiting raoul placement       Vital Signs Last 24 Hrs  T(C): 36.6 (20 Dec 2021 04:55), Max: 36.9 (19 Dec 2021 19:48)  T(F): 97.8 (20 Dec 2021 04:55), Max: 98.4 (19 Dec 2021 19:48)  HR: 84 (20 Dec 2021 04:55) (74 - 89)  BP: 143/86 (20 Dec 2021 04:55) (134/83 - 151/80)  BP(mean): --  RR: 18 (20 Dec 2021 04:55) (18 - 18)  SpO2: 94% (20 Dec 2021 04:55) (94% - 96%)  Drug Dosing Weight  Height (cm): 180.3 (14 Dec 2021 12:35)  Weight (kg): 74.8 (14 Dec 2021 12:35)  BMI (kg/m2): 23 (14 Dec 2021 12:35)  BSA (m2): 1.94 (14 Dec 2021 12:35)  I&O's Detail    18 Dec 2021 07:01  -  19 Dec 2021 07:00  --------------------------------------------------------  IN:    Oral Fluid: 600 mL  Total IN: 600 mL    OUT:    Voided (mL): 650 mL  Total OUT: 650 mL    Total NET: -50 mL        Allergies    Allergy Status Unknown    Intolerances                ROS:    PHYSICAL EXAM:    Constitutional: NAD  Respiratory: no accessory muscle use, no supplemental o2 needed, pic score 8  Cardiovascular: RRR  Gastrointestinal: soft, NT/ND  genitourinary ; voiding spontaneously   neuro: A&OX3 but can be confused at times mild dementia      MEDICATIONS  (STANDING):  acetaminophen     Tablet .. 650 milliGRAM(s) Oral every 6 hours  amitriptyline Oral Tab/Cap - Peds 75 milliGRAM(s) Oral at bedtime  amLODIPine   Tablet 5 milliGRAM(s) Oral daily  donepezil 10 milliGRAM(s) Oral daily  enoxaparin Injectable 40 milliGRAM(s) SubCutaneous daily  gabapentin 300 milliGRAM(s) Oral every 8 hours  influenza  Vaccine (HIGH DOSE) 0.7 milliLiter(s) IntraMuscular once  lidocaine   4% Patch 1 Patch Transdermal daily  memantine 20 milliGRAM(s) Oral <User Schedule>  methocarbamol 500 milliGRAM(s) Oral every 6 hours  sertraline 200 milliGRAM(s) Oral daily    MEDICATIONS  (PRN):  ondansetron Injectable 4 milliGRAM(s) IV Push every 6 hours PRN Nausea  traMADol 25 milliGRAM(s) Oral every 4 hours PRN Moderate Pain (4 - 6)  traMADol 50 milliGRAM(s) Oral every 4 hours PRN Severe Pain (7 - 10)      RADIOLOGY STUDIES:    CULTURES:        
SUBJECTIVE/24 hour events:  Patient is a 81yMale s/p ground level fall sustaining 3 left rib fractures. Patient with no acute events overnight, pain controlled with mild adjustment to regimen, pulling 2000 on the incentive spirometer, pic score7-8. Patient discharged delayed 2/2 unsteady gait, seen by PT pending progress raoul vs home with assist           Vital Signs Last 24 Hrs  T(C): 36.4 (15 Dec 2021 22:48), Max: 36.8 (15 Dec 2021 19:50)  T(F): 97.6 (15 Dec 2021 22:48), Max: 98.3 (15 Dec 2021 19:50)  HR: 68 (15 Dec 2021 22:48) (68 - 79)  BP: 164/88 (15 Dec 2021 22:48) (134/75 - 164/88)  BP(mean): --  RR: 20 (15 Dec 2021 22:48) (18 - 20)  SpO2: 96% (15 Dec 2021 22:48) (94% - 98%)  Drug Dosing Weight  Height (cm): 180.3 (14 Dec 2021 12:35)  Weight (kg): 74.8 (14 Dec 2021 12:35)  BMI (kg/m2): 23 (14 Dec 2021 12:35)  BSA (m2): 1.94 (14 Dec 2021 12:35)  I&O's Detail    Allergies    Allergy Status Unknown    Intolerances                              11.7   4.05  )-----------( 180      ( 15 Dec 2021 06:59 )             33.8   12-15    140  |  103  |  18.5  ----------------------------<  98  3.6   |  24.0  |  0.79    Ca    8.6      15 Dec 2021 06:59  Phos  4.8     12-15  Mg     2.0     12-15    TPro  6.1<L>  /  Alb  4.1  /  TBili  0.3<L>  /  DBili  x   /  AST  18  /  ALT  30  /  AlkPhos  38<L>  12-14  PT/INR - ( 14 Dec 2021 16:17 )   PT: 12.4 sec;   INR: 1.07 ratio         PTT - ( 14 Dec 2021 16:17 )  PTT:29.9 sec    ROS:    PHYSICAL EXAM:  Constitutional: in good spirits, appears younger than stated age  Respiratory: no respiratory distress, no dyspnea, no supplemental o2 needed, pic score 7-8, 2000 on the incentive spirometer  Gastrointestinal: abdomen soft, non-tender, atraumatic  Genitourinary: voiding spontaneously   Neurological: A&Ox3  Skin: warm, dry and no rashes       MEDICATIONS  (STANDING):  acetaminophen     Tablet .. 650 milliGRAM(s) Oral every 6 hours  amitriptyline Oral Tab/Cap - Peds 75 milliGRAM(s) Oral at bedtime  amLODIPine   Tablet 5 milliGRAM(s) Oral daily  enoxaparin Injectable 40 milliGRAM(s) SubCutaneous daily  gabapentin 300 milliGRAM(s) Oral every 8 hours  influenza  Vaccine (HIGH DOSE) 0.7 milliLiter(s) IntraMuscular once  ketorolac   Injectable 15 milliGRAM(s) IV Push every 6 hours  lidocaine   4% Patch 1 Patch Transdermal daily  methocarbamol 500 milliGRAM(s) Oral every 6 hours    MEDICATIONS  (PRN):  ondansetron Injectable 4 milliGRAM(s) IV Push every 6 hours PRN Nausea  traMADol 25 milliGRAM(s) Oral every 4 hours PRN Moderate Pain (4 - 6)  traMADol 50 milliGRAM(s) Oral every 4 hours PRN Severe Pain (7 - 10)      RADIOLOGY STUDIES:    CULTURES:        
SUBJECTIVE/24 hour events:  Patient is a 81yMale s/p ground level fall sustaining 3 left rib fractures. Patient with no acute events overnight, pain controlled with mild adjustment to regimen, pulling 2000 on the incentive spirometer, pic score7-8. Pending MENDEZ placement.      Vital Signs Last 24 Hrs  T(C): 36.4 (16 Dec 2021 19:53), Max: 36.6 (16 Dec 2021 16:06)  T(F): 97.6 (16 Dec 2021 19:53), Max: 97.9 (16 Dec 2021 16:06)  HR: 80 (16 Dec 2021 19:53) (74 - 84)  BP: 152/87 (16 Dec 2021 19:53) (125/70 - 152/87)  BP(mean): --  ABP: --  ABP(mean): --  RR: 18 (16 Dec 2021 19:53) (18 - 18)  SpO2: 97% (16 Dec 2021 19:53) (96% - 98%)      PHYSICAL EXAM:  Constitutional: in good spirits, appears younger than stated age  Respiratory: no respiratory distress, no dyspnea, no supplemental o2 needed, pic score 7-8, 2000 on the incentive spirometer  Gastrointestinal: abdomen soft, non-tender, atraumatic  Genitourinary: voiding spontaneously   Neurological: A&Ox3  Skin: warm, dry and no rashes     I&O's Detail    .  LABS:                         11.7   4.05  )-----------( 180      ( 15 Dec 2021 06:59 )             33.8     12-15    140  |  103  |  18.5  ----------------------------<  98  3.6   |  24.0  |  0.79    Ca    8.6      15 Dec 2021 06:59  Phos  4.8     12-15  Mg     2.0     12-15                RADIOLOGY, EKG & ADDITIONAL TESTS: Reviewed.         
SUBJECTIVE/24 hour events:  Patient is a 81yMale s/p ground level fall sustaining 3 left rib fractures. Patient with no acute events overnight, pain controlled with mild adjustment to regimen, pulling 2000 on the incentive spirometer, pic score7-8.       Vital Signs Last 24 Hrs  T(C): 36.5 (14 Dec 2021 21:42), Max: 36.8 (14 Dec 2021 17:44)  T(F): 97.7 (14 Dec 2021 21:42), Max: 98.3 (14 Dec 2021 17:44)  HR: 81 (14 Dec 2021 21:42) (63 - 87)  BP: 130/76 (14 Dec 2021 21:42) (130/76 - 157/76)  BP(mean): --  RR: 18 (14 Dec 2021 21:42) (18 - 18)  SpO2: 96% (14 Dec 2021 21:42) (96% - 98%)  Drug Dosing Weight  Height (cm): 180.3 (14 Dec 2021 12:35)  Weight (kg): 74.8 (14 Dec 2021 12:35)  BMI (kg/m2): 23 (14 Dec 2021 12:35)  BSA (m2): 1.94 (14 Dec 2021 12:35)  I&O's Detail    Allergies    Allergy Status Unknown    Intolerances                              11.5   9.00  )-----------( 198      ( 14 Dec 2021 16:17 )             33.2   12-14    138  |  102  |  19.7  ----------------------------<  90  3.9   |  22.0  |  0.78    Ca    8.8      14 Dec 2021 16:17  Phos  3.4     12-14  Mg     1.7     12-14    TPro  6.1<L>  /  Alb  4.1  /  TBili  0.3<L>  /  DBili  x   /  AST  18  /  ALT  30  /  AlkPhos  38<L>  12-14  PT/INR - ( 14 Dec 2021 16:17 )   PT: 12.4 sec;   INR: 1.07 ratio         PTT - ( 14 Dec 2021 16:17 )  PTT:29.9 sec    ROS:    PHYSICAL EXAM:  Constitutional: in good spirits, appears younger than stated age  Respiratory: no respiratory distress, no dyspnea, no supplemental o2 needed, pic score 7-8, 2000 on the incentive spirometer  Gastrointestinal: abdomen soft, non-tender, atraumatic  Genitourinary: voiding spontaneously   Neurological: A&Ox3  Skin: warm, dry and no rashes       MEDICATIONS  (STANDING):  acetaminophen     Tablet .. 650 milliGRAM(s) Oral every 6 hours  amitriptyline Oral Tab/Cap - Peds 75 milliGRAM(s) Oral at bedtime  amLODIPine   Tablet 5 milliGRAM(s) Oral daily  enoxaparin Injectable 40 milliGRAM(s) SubCutaneous daily  gabapentin 300 milliGRAM(s) Oral every 8 hours  ketorolac   Injectable 15 milliGRAM(s) IV Push every 6 hours  lidocaine   4% Patch 1 Patch Transdermal daily  methocarbamol 500 milliGRAM(s) Oral every 6 hours  pantoprazole    Tablet 40 milliGRAM(s) Oral before breakfast    MEDICATIONS  (PRN):  ondansetron Injectable 4 milliGRAM(s) IV Push every 6 hours PRN Nausea  traMADol 25 milliGRAM(s) Oral every 4 hours PRN Moderate Pain (4 - 6)  traMADol 50 milliGRAM(s) Oral every 4 hours PRN Severe Pain (7 - 10)      RADIOLOGY STUDIES:    CULTURES:        
Trauma Surgery Progress Note:    Patient with confusion secondary to dementia and late initiation of medications- Gosia.    However, overnight he rested well. No acute events. Patient afebrile, VSS. Pain well controlled. Tolerating diet. Denies n/v/f/c/cp/sob.     Diet, Regular (12-14-21 @ 14:41)      Scheduled Medications:   acetaminophen     Tablet .. 650 milliGRAM(s) Oral every 6 hours  amitriptyline Oral Tab/Cap - Peds 75 milliGRAM(s) Oral at bedtime  amLODIPine   Tablet 5 milliGRAM(s) Oral daily  donepezil 10 milliGRAM(s) Oral daily  enoxaparin Injectable 40 milliGRAM(s) SubCutaneous daily  gabapentin 300 milliGRAM(s) Oral every 8 hours  influenza  Vaccine (HIGH DOSE) 0.7 milliLiter(s) IntraMuscular once  lidocaine   4% Patch 1 Patch Transdermal daily  memantine 20 milliGRAM(s) Oral <User Schedule>  methocarbamol 500 milliGRAM(s) Oral every 6 hours    PRN Medications:  ondansetron Injectable 4 milliGRAM(s) IV Push every 6 hours PRN Nausea  traMADol 25 milliGRAM(s) Oral every 4 hours PRN Moderate Pain (4 - 6)  traMADol 50 milliGRAM(s) Oral every 4 hours PRN Severe Pain (7 - 10)      Objective:   T(F): 98.5 (12-17 @ 21:40), Max: 98.5 (12-17 @ 21:40)  HR: 88 (12-17 @ 21:40) (72 - 88)  BP: 158/82 (12-17 @ 21:40) (149/80 - 162/82)  BP(mean): --  ABP: --  ABP(mean): --  RR: 18 (12-17 @ 21:40) (18 - 18)  SpO2: 94% (12-17 @ 21:40) (94% - 95%)      Physical Exam:   GEN: patient resting comfortably in bed, in no acute distress  RESP: respirations are unlabored, no accessory muscle use, no conversational dyspnea, mild tenderness to L intercostals  CVS: RRR  GI: Abdomen soft, non-tender, non-distended, no rebound tenderness / guarding  MSK: moving all extremities without difficulty    I&O's    12-16 @ 07:01 - 12-17 @ 07:00  --------------------------------------------------------  IN:  Total IN: 0 mL    OUT:    Voided (mL): 200 mL  Total OUT: 200 mL    Total NET: -200 mL          LABS:                MICROBIOLOGY:       PATHOLOGY:

## 2021-12-20 NOTE — CONSULT NOTE ADULT - ASSESSMENT
Patient and wife interested in CHANDRAKANT nerve block   r/b/a discussed  written consent obtained and witnessed  Please see procedure note for details

## 2021-12-20 NOTE — PROGRESS NOTE ADULT - ATTENDING COMMENTS
Clinically stable.   Drawing 2L on IS; but moderate cough (limited by pain), and reports moderate pain.     --Plan as above.   --PT/OT, SW dispo planning.
Agree with above assessment.  The patient was seen and examined by me.  The patient is without new events overnight.  The patient is trauma stable, discharge planning for MENDEZ.
No acute events, no complaints, pain controlled, PIC score 8-9. Awaiting MENDEZ placement
Patient seen and eval on rounds. States pain has resolved. pulling 2000 on IS. Stable for d/c from trauma standpoint.
Pt seen on rounds, pain controlled, needs to work with PT, awaiting MENDEZ placement
Agree with above assessment.  The patient was seen and examined by me.  The patient is without any new events overnight.  The patient is trauma stable for discharge planning.

## 2021-12-20 NOTE — CONSULT NOTE ADULT - SUBJECTIVE AND OBJECTIVE BOX
CC: rib pain    HPI: per chart review:    81yMale s/p ground level fall sustaining 3 left rib fractures. Patient with no acute events overnight, pain controlled with mild adjustment to regimen, pulling 2000 on the incentive spirometer, pic score7-8. Pending MENDEZ placement.      Interval Hx:  Patient seen during rounds  due for MENDEZ today  c.o increased pain with movement  no pain at rest  pulling >2L on IS  pt and wife interested in nerve block  Patient denies sedation with medications       T(C): 36.6 (12-20-21 @ 04:55), Max: 36.9 (12-19-21 @ 19:48)  HR: 84 (12-20-21 @ 04:55) (84 - 85)  BP: 143/86 (12-20-21 @ 04:55) (134/83 - 143/86)  RR: 18 (12-20-21 @ 08:00) (18 - 18)  SpO2: 96% (12-20-21 @ 08:00) (94% - 96%)      12-19-21 @ 07:01  -  12-20-21 @ 07:00  --------------------------------------------------------  IN: 0 mL / OUT: 350 mL / NET: -350 mL        acetaminophen     Tablet .. 650 milliGRAM(s) Oral every 6 hours  amitriptyline Oral Tab/Cap - Peds 75 milliGRAM(s) Oral at bedtime  amLODIPine   Tablet 5 milliGRAM(s) Oral daily  bisacodyl Suppository 10 milliGRAM(s) Rectal once  donepezil 10 milliGRAM(s) Oral daily  enoxaparin Injectable 40 milliGRAM(s) SubCutaneous daily  gabapentin 300 milliGRAM(s) Oral every 8 hours  influenza  Vaccine (HIGH DOSE) 0.7 milliLiter(s) IntraMuscular once  lidocaine   4% Patch 1 Patch Transdermal daily  memantine 20 milliGRAM(s) Oral <User Schedule>  methocarbamol 500 milliGRAM(s) Oral every 6 hours  ondansetron Injectable 4 milliGRAM(s) IV Push every 6 hours PRN  polyethylene glycol 3350 17 Gram(s) Oral two times a day  psyllium Powder 1 Packet(s) Oral at bedtime  senna 2 Tablet(s) Oral at bedtime  sertraline 200 milliGRAM(s) Oral daily  traMADol 25 milliGRAM(s) Oral every 4 hours PRN  traMADol 50 milliGRAM(s) Oral every 4 hours PRN                  Pain Service   [phone]

## 2021-12-29 PROCEDURE — 97163 PT EVAL HIGH COMPLEX 45 MIN: CPT

## 2021-12-29 PROCEDURE — 85730 THROMBOPLASTIN TIME PARTIAL: CPT

## 2021-12-29 PROCEDURE — 96374 THER/PROPH/DIAG INJ IV PUSH: CPT

## 2021-12-29 PROCEDURE — 96375 TX/PRO/DX INJ NEW DRUG ADDON: CPT

## 2021-12-29 PROCEDURE — 72125 CT NECK SPINE W/O DYE: CPT | Mod: MA

## 2021-12-29 PROCEDURE — 85610 PROTHROMBIN TIME: CPT

## 2021-12-29 PROCEDURE — 93005 ELECTROCARDIOGRAM TRACING: CPT

## 2021-12-29 PROCEDURE — 83690 ASSAY OF LIPASE: CPT

## 2021-12-29 PROCEDURE — 80053 COMPREHEN METABOLIC PANEL: CPT

## 2021-12-29 PROCEDURE — 80048 BASIC METABOLIC PNL TOTAL CA: CPT

## 2021-12-29 PROCEDURE — 36415 COLL VENOUS BLD VENIPUNCTURE: CPT

## 2021-12-29 PROCEDURE — 85025 COMPLETE CBC W/AUTO DIFF WBC: CPT

## 2021-12-29 PROCEDURE — 71250 CT THORAX DX C-: CPT | Mod: MA

## 2021-12-29 PROCEDURE — U0003: CPT

## 2021-12-29 PROCEDURE — 83735 ASSAY OF MAGNESIUM: CPT

## 2021-12-29 PROCEDURE — U0005: CPT

## 2021-12-29 PROCEDURE — 70450 CT HEAD/BRAIN W/O DYE: CPT | Mod: MA

## 2021-12-29 PROCEDURE — 99285 EMERGENCY DEPT VISIT HI MDM: CPT

## 2021-12-29 PROCEDURE — 84100 ASSAY OF PHOSPHORUS: CPT

## 2022-01-18 ENCOUNTER — TRANSCRIPTION ENCOUNTER (OUTPATIENT)
Age: 82
End: 2022-01-18

## 2022-02-15 NOTE — PHYSICAL THERAPY INITIAL EVALUATION ADULT - PROPRIOCEPTION, RLE, OT EVAL
Is This A New Presentation, Or A Follow-Up?: Skin Lesions How Severe Is Your Skin Lesion?: mild Has Your Skin Lesion Been Treated?: not been treated within normal limits

## 2022-04-08 PROBLEM — Z00.00 ENCOUNTER FOR PREVENTIVE HEALTH EXAMINATION: Noted: 2022-04-08

## 2022-04-11 ENCOUNTER — TRANSCRIPTION ENCOUNTER (OUTPATIENT)
Age: 82
End: 2022-04-11

## 2022-04-11 ENCOUNTER — NON-APPOINTMENT (OUTPATIENT)
Age: 82
End: 2022-04-11

## 2022-04-11 ENCOUNTER — APPOINTMENT (OUTPATIENT)
Dept: PULMONOLOGY | Facility: CLINIC | Age: 82
End: 2022-04-11
Payer: MEDICARE

## 2022-04-11 VITALS
HEIGHT: 70 IN | WEIGHT: 170 LBS | OXYGEN SATURATION: 96 % | BODY MASS INDEX: 24.34 KG/M2 | TEMPERATURE: 97.2 F | SYSTOLIC BLOOD PRESSURE: 108 MMHG | DIASTOLIC BLOOD PRESSURE: 70 MMHG | HEART RATE: 91 BPM

## 2022-04-11 PROCEDURE — 99204 OFFICE O/P NEW MOD 45 MIN: CPT

## 2022-04-11 RX ORDER — MEMANTINE HYDROCHLORIDE 28 MG/1
28 CAPSULE, EXTENDED RELEASE ORAL
Refills: 0 | Status: ACTIVE | COMMUNITY

## 2022-04-11 NOTE — HISTORY OF PRESENT ILLNESS
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

## 2022-04-11 NOTE — DISCUSSION/SUMMARY
[FreeTextEntry1] : Mr Adler has a chronic cough for 4 months.  He has been on Symbicort with no improvement.  He denies any asthma symptoms and is unlikely that this is a cough variant asthma.  Patient has a long history of gastroesophageal reflux disease and is on medicines for same.  Certainly GERD can cause a chronic cough and I like to maximize his medication but at this time his wife is unsure what medication he is on.  She is going to call with his list of medications and then further recommendations will be made.  He does not have significant sinus postnasal drip symptoms but is possible this is the etiology and we might decide to initiate an nasal steroid after his list of medications is known.  I reviewed the patient's CT of the chest.  The right upper lobe groundglass nodule is very faint and not accessible for biopsy.  I favor follow-up CAT scan in 6 months.  The patient understands and agrees with this plan of care.\par The patient understands and agrees with plan of care.\par Today's office visit encompassed 46  minutes. I conducted an extensive history ,physical exam and reviewed diagnosis and treatment options  including diagnostic tests,radiologic studies including cat scans  and the use of prescription medication.

## 2022-04-11 NOTE — REASON FOR VISIT
[Initial] : an initial visit [Cough] : cough [Pulmonary Nodules] : pulmonary nodules [Wheezing] : wheezing [Spouse] : spouse [TextBox_44] : assessment. Pt experiences a cough that sometimes presents phlegm and wheezing.

## 2022-04-11 NOTE — PROCEDURE
[FreeTextEntry1] : CT chest 4/1/2022 reveals 1.3 cm groundglass nodule right upper lobe apex.  My view very faint groundglass area.

## 2022-05-12 ENCOUNTER — APPOINTMENT (OUTPATIENT)
Dept: ORTHOPEDIC SURGERY | Facility: CLINIC | Age: 82
End: 2022-05-12

## 2022-05-23 ENCOUNTER — APPOINTMENT (OUTPATIENT)
Dept: ORTHOPEDIC SURGERY | Facility: CLINIC | Age: 82
End: 2022-05-23
Payer: MEDICARE

## 2022-05-23 VITALS — BODY MASS INDEX: 25.05 KG/M2 | HEIGHT: 70 IN | WEIGHT: 175 LBS

## 2022-05-23 PROCEDURE — 20550 NJX 1 TENDON SHEATH/LIGAMENT: CPT

## 2022-05-23 PROCEDURE — 99213 OFFICE O/P EST LOW 20 MIN: CPT | Mod: 25

## 2022-05-23 NOTE — HISTORY OF PRESENT ILLNESS
[8] : 8 [Localized] : localized [Shooting] : shooting [Constant] : constant [de-identified] : Mr. Mariano Adler, 80 yo M, being followed for right wrist ECU tendinitis. Pain has returned. Had two weeks from last CSI. H/o distal ulnar replacement 2021. [] : no [FreeTextEntry1] : rt wrist  [FreeTextEntry5] : pt states he is feeling the same since his last visit  [FreeTextEntry9] : ibuprofen  [de-identified] : 06/2021

## 2022-05-23 NOTE — PROCEDURE
[Tendon Sheath] : tendon sheath [Right] : of the right [Pain] : pain [Inflammation] : inflammation [Alcohol] : alcohol [Ethyl Chloride sprayed topically] : ethyl chloride sprayed topically [Sterile technique used] : sterile technique used [___ cc    1%] : Lidocaine ~Vcc of 1%  [___ cc    10mg] : Triamcinolone (Kenalog) ~Vcc of 10 mg  [de-identified] : U

## 2022-05-24 RX ORDER — AMLODIPINE BESYLATE 2.5 MG/1
1 TABLET ORAL
Qty: 0 | Refills: 0 | DISCHARGE

## 2022-05-24 RX ORDER — SERTRALINE 25 MG/1
2 TABLET, FILM COATED ORAL
Qty: 0 | Refills: 0 | DISCHARGE

## 2022-05-24 RX ORDER — AMITRIPTYLINE HCL 25 MG
1 TABLET ORAL
Qty: 0 | Refills: 0 | DISCHARGE

## 2022-05-27 ENCOUNTER — APPOINTMENT (OUTPATIENT)
Dept: PULMONOLOGY | Facility: CLINIC | Age: 82
End: 2022-05-27

## 2022-06-17 ENCOUNTER — APPOINTMENT (OUTPATIENT)
Dept: PULMONOLOGY | Facility: CLINIC | Age: 82
End: 2022-06-17
Payer: MEDICARE

## 2022-06-17 ENCOUNTER — NON-APPOINTMENT (OUTPATIENT)
Age: 82
End: 2022-06-17

## 2022-06-17 VITALS
HEART RATE: 78 BPM | OXYGEN SATURATION: 94 % | DIASTOLIC BLOOD PRESSURE: 76 MMHG | HEIGHT: 70 IN | BODY MASS INDEX: 24.34 KG/M2 | WEIGHT: 170 LBS | TEMPERATURE: 97.4 F | SYSTOLIC BLOOD PRESSURE: 122 MMHG

## 2022-06-17 PROCEDURE — 94010 BREATHING CAPACITY TEST: CPT

## 2022-06-17 PROCEDURE — 99214 OFFICE O/P EST MOD 30 MIN: CPT | Mod: 25

## 2022-06-17 RX ORDER — DONEPEZIL HYDROCHLORIDE 10 MG/1
10 TABLET ORAL
Qty: 90 | Refills: 0 | Status: ACTIVE | COMMUNITY
Start: 2021-10-11

## 2022-06-17 NOTE — HISTORY OF PRESENT ILLNESS
[Never] : never [TextBox_4] : 82 male with persistent cough  minimal overall change \par no chest pain no tightness\par +wheeze gabriella lying down worse in am\par no throat clearing\par no tobacco\par no chemical exposure\par no dog cat birds

## 2022-06-17 NOTE — DISCUSSION/SUMMARY
[FreeTextEntry1] : Mr Adler has a cough for many months.  He has been on multiple medicines without improvement.  He has significant rhonchi and wheezes at this time with minimal changes on his spirometry.  CAT scan shows no significant disease and a very faint groundglass area in the right upper lobe.  I feel we should repeat the CAT scan in 6 months to ensure stability.  We will start prednisone 40 mg daily and I want him to hold at this dose for 2 weeks until he sees me once again.  We will continue the Symbicort 2 sprays twice a day and Tessalon Perles.  He can use Robitussin as well to temper the cough.  Patient and his wife understand and agree with this plan of care.\par The patient understands and agrees with plan of care.\par Today's office visit encompassed 32 minutes. I conducted an extensive history ,physical exam and reviewed diagnosis and treatment options  including diagnostic tests,radiologic studies including  cat scans  and the use of prescription medication.

## 2022-06-17 NOTE — REASON FOR VISIT
[Follow-Up] : a follow-up visit [Cough] : cough [Pulmonary Nodules] : pulmonary nodules [Wheezing] : wheezing [TextBox_44] : 2 months, X Ray Denilson April 2022, CT May 2022 Denilson

## 2022-06-23 ENCOUNTER — EMERGENCY (EMERGENCY)
Facility: HOSPITAL | Age: 82
LOS: 1 days | Discharge: HOME | End: 2022-06-23
Attending: EMERGENCY MEDICINE
Payer: MEDICARE

## 2022-06-23 VITALS
RESPIRATION RATE: 20 BRPM | OXYGEN SATURATION: 96 % | SYSTOLIC BLOOD PRESSURE: 150 MMHG | TEMPERATURE: 98 F | HEIGHT: 71 IN | DIASTOLIC BLOOD PRESSURE: 51 MMHG | HEART RATE: 86 BPM | WEIGHT: 169.98 LBS

## 2022-06-23 DIAGNOSIS — E78.5 HYPERLIPIDEMIA, UNSPECIFIED: ICD-10-CM

## 2022-06-23 DIAGNOSIS — R07.9 CHEST PAIN, UNSPECIFIED: ICD-10-CM

## 2022-06-23 DIAGNOSIS — I10 ESSENTIAL (PRIMARY) HYPERTENSION: ICD-10-CM

## 2022-06-23 LAB
ALBUMIN SERPL ELPH-MCNC: 4.3 G/DL — SIGNIFICANT CHANGE UP (ref 3.3–5.2)
ALP SERPL-CCNC: 47 U/L — SIGNIFICANT CHANGE UP (ref 40–120)
ALT FLD-CCNC: 88 U/L — HIGH
ANION GAP SERPL CALC-SCNC: 13 MMOL/L — SIGNIFICANT CHANGE UP (ref 5–17)
AST SERPL-CCNC: 117 U/L — HIGH
BASOPHILS # BLD AUTO: 0.02 K/UL — SIGNIFICANT CHANGE UP (ref 0–0.2)
BASOPHILS NFR BLD AUTO: 0.2 % — SIGNIFICANT CHANGE UP (ref 0–2)
BILIRUB SERPL-MCNC: <0.2 MG/DL — LOW (ref 0.4–2)
BUN SERPL-MCNC: 26.3 MG/DL — HIGH (ref 8–20)
CALCIUM SERPL-MCNC: 9.5 MG/DL — SIGNIFICANT CHANGE UP (ref 8.6–10.2)
CHLORIDE SERPL-SCNC: 102 MMOL/L — SIGNIFICANT CHANGE UP (ref 98–107)
CO2 SERPL-SCNC: 26 MMOL/L — SIGNIFICANT CHANGE UP (ref 22–29)
CREAT SERPL-MCNC: 1.01 MG/DL — SIGNIFICANT CHANGE UP (ref 0.5–1.3)
EGFR: 75 ML/MIN/1.73M2 — SIGNIFICANT CHANGE UP
EOSINOPHIL # BLD AUTO: 0.04 K/UL — SIGNIFICANT CHANGE UP (ref 0–0.5)
EOSINOPHIL NFR BLD AUTO: 0.4 % — SIGNIFICANT CHANGE UP (ref 0–6)
FLUAV AG NPH QL: SIGNIFICANT CHANGE UP
FLUBV AG NPH QL: SIGNIFICANT CHANGE UP
GLUCOSE SERPL-MCNC: 107 MG/DL — HIGH (ref 70–99)
HCT VFR BLD CALC: 35.9 % — LOW (ref 39–50)
HGB BLD-MCNC: 12.1 G/DL — LOW (ref 13–17)
IMM GRANULOCYTES NFR BLD AUTO: 1.5 % — SIGNIFICANT CHANGE UP (ref 0–1.5)
LYMPHOCYTES # BLD AUTO: 1.16 K/UL — SIGNIFICANT CHANGE UP (ref 1–3.3)
LYMPHOCYTES # BLD AUTO: 12.8 % — LOW (ref 13–44)
MCHC RBC-ENTMCNC: 31.5 PG — SIGNIFICANT CHANGE UP (ref 27–34)
MCHC RBC-ENTMCNC: 33.7 GM/DL — SIGNIFICANT CHANGE UP (ref 32–36)
MCV RBC AUTO: 93.5 FL — SIGNIFICANT CHANGE UP (ref 80–100)
MONOCYTES # BLD AUTO: 0.55 K/UL — SIGNIFICANT CHANGE UP (ref 0–0.9)
MONOCYTES NFR BLD AUTO: 6.1 % — SIGNIFICANT CHANGE UP (ref 2–14)
NEUTROPHILS # BLD AUTO: 7.14 K/UL — SIGNIFICANT CHANGE UP (ref 1.8–7.4)
NEUTROPHILS NFR BLD AUTO: 79 % — HIGH (ref 43–77)
PLATELET # BLD AUTO: 278 K/UL — SIGNIFICANT CHANGE UP (ref 150–400)
POTASSIUM SERPL-MCNC: 4.3 MMOL/L — SIGNIFICANT CHANGE UP (ref 3.5–5.3)
POTASSIUM SERPL-SCNC: 4.3 MMOL/L — SIGNIFICANT CHANGE UP (ref 3.5–5.3)
PROT SERPL-MCNC: 6.8 G/DL — SIGNIFICANT CHANGE UP (ref 6.6–8.7)
RBC # BLD: 3.84 M/UL — LOW (ref 4.2–5.8)
RBC # FLD: 13.3 % — SIGNIFICANT CHANGE UP (ref 10.3–14.5)
RSV RNA NPH QL NAA+NON-PROBE: SIGNIFICANT CHANGE UP
SARS-COV-2 RNA SPEC QL NAA+PROBE: SIGNIFICANT CHANGE UP
SODIUM SERPL-SCNC: 141 MMOL/L — SIGNIFICANT CHANGE UP (ref 135–145)
TROPONIN T SERPL-MCNC: <0.01 NG/ML — SIGNIFICANT CHANGE UP (ref 0–0.06)
WBC # BLD: 9.05 K/UL — SIGNIFICANT CHANGE UP (ref 3.8–10.5)
WBC # FLD AUTO: 9.05 K/UL — SIGNIFICANT CHANGE UP (ref 3.8–10.5)

## 2022-06-23 PROCEDURE — 93306 TTE W/DOPPLER COMPLETE: CPT | Mod: 26

## 2022-06-23 PROCEDURE — 99220: CPT

## 2022-06-23 PROCEDURE — 71045 X-RAY EXAM CHEST 1 VIEW: CPT | Mod: 26

## 2022-06-23 PROCEDURE — 99285 EMERGENCY DEPT VISIT HI MDM: CPT

## 2022-06-23 PROCEDURE — 93010 ELECTROCARDIOGRAM REPORT: CPT

## 2022-06-23 RX ORDER — MEMANTINE HYDROCHLORIDE 10 MG/1
25 TABLET ORAL DAILY
Refills: 0 | Status: DISCONTINUED | OUTPATIENT
Start: 2022-06-23 | End: 2022-06-27

## 2022-06-23 RX ORDER — SERTRALINE 25 MG/1
200 TABLET, FILM COATED ORAL DAILY
Refills: 0 | Status: DISCONTINUED | OUTPATIENT
Start: 2022-06-23 | End: 2022-06-27

## 2022-06-23 RX ORDER — AMLODIPINE BESYLATE 2.5 MG/1
5 TABLET ORAL DAILY
Refills: 0 | Status: DISCONTINUED | OUTPATIENT
Start: 2022-06-23 | End: 2022-06-24

## 2022-06-23 RX ORDER — DONEPEZIL HYDROCHLORIDE 10 MG/1
10 TABLET, FILM COATED ORAL AT BEDTIME
Refills: 0 | Status: DISCONTINUED | OUTPATIENT
Start: 2022-06-23 | End: 2022-06-27

## 2022-06-23 RX ORDER — ATORVASTATIN CALCIUM 80 MG/1
20 TABLET, FILM COATED ORAL AT BEDTIME
Refills: 0 | Status: DISCONTINUED | OUTPATIENT
Start: 2022-06-23 | End: 2022-06-27

## 2022-06-23 RX ORDER — GABAPENTIN 400 MG/1
600 CAPSULE ORAL EVERY 8 HOURS
Refills: 0 | Status: DISCONTINUED | OUTPATIENT
Start: 2022-06-23 | End: 2022-06-27

## 2022-06-23 RX ORDER — GABAPENTIN 400 MG/1
300 CAPSULE ORAL EVERY 8 HOURS
Refills: 0 | Status: DISCONTINUED | OUTPATIENT
Start: 2022-06-23 | End: 2022-06-23

## 2022-06-23 RX ORDER — ACETAMINOPHEN 500 MG
650 TABLET ORAL EVERY 6 HOURS
Refills: 0 | Status: DISCONTINUED | OUTPATIENT
Start: 2022-06-23 | End: 2022-06-27

## 2022-06-23 RX ORDER — ASPIRIN/CALCIUM CARB/MAGNESIUM 324 MG
325 TABLET ORAL ONCE
Refills: 0 | Status: COMPLETED | OUTPATIENT
Start: 2022-06-23 | End: 2022-06-23

## 2022-06-23 RX ORDER — AMITRIPTYLINE HCL 25 MG
75 TABLET ORAL AT BEDTIME
Refills: 0 | Status: DISCONTINUED | OUTPATIENT
Start: 2022-06-23 | End: 2022-06-27

## 2022-06-23 RX ORDER — DIVALPROEX SODIUM 500 MG/1
250 TABLET, DELAYED RELEASE ORAL AT BEDTIME
Refills: 0 | Status: DISCONTINUED | OUTPATIENT
Start: 2022-06-23 | End: 2022-06-27

## 2022-06-23 RX ORDER — OXYBUTYNIN CHLORIDE 5 MG
10 TABLET ORAL
Refills: 0 | Status: DISCONTINUED | OUTPATIENT
Start: 2022-06-23 | End: 2022-06-27

## 2022-06-23 RX ORDER — SENNA PLUS 8.6 MG/1
2 TABLET ORAL AT BEDTIME
Refills: 0 | Status: DISCONTINUED | OUTPATIENT
Start: 2022-06-23 | End: 2022-06-27

## 2022-06-23 RX ORDER — PANTOPRAZOLE SODIUM 20 MG/1
40 TABLET, DELAYED RELEASE ORAL
Refills: 0 | Status: DISCONTINUED | OUTPATIENT
Start: 2022-06-23 | End: 2022-06-27

## 2022-06-23 RX ADMIN — AMLODIPINE BESYLATE 5 MILLIGRAM(S): 2.5 TABLET ORAL at 17:56

## 2022-06-23 RX ADMIN — DIVALPROEX SODIUM 250 MILLIGRAM(S): 500 TABLET, DELAYED RELEASE ORAL at 22:54

## 2022-06-23 RX ADMIN — Medication 325 MILLIGRAM(S): at 17:56

## 2022-06-23 RX ADMIN — SERTRALINE 200 MILLIGRAM(S): 25 TABLET, FILM COATED ORAL at 17:56

## 2022-06-23 RX ADMIN — DONEPEZIL HYDROCHLORIDE 10 MILLIGRAM(S): 10 TABLET, FILM COATED ORAL at 22:54

## 2022-06-23 RX ADMIN — Medication 75 MILLIGRAM(S): at 22:54

## 2022-06-23 RX ADMIN — GABAPENTIN 600 MILLIGRAM(S): 400 CAPSULE ORAL at 22:54

## 2022-06-23 RX ADMIN — SENNA PLUS 2 TABLET(S): 8.6 TABLET ORAL at 22:54

## 2022-06-23 RX ADMIN — ATORVASTATIN CALCIUM 20 MILLIGRAM(S): 80 TABLET, FILM COATED ORAL at 22:54

## 2022-06-23 NOTE — CONSULT NOTE ADULT - PROBLEM SELECTOR RECOMMENDATION 9
- Troponin negative x 1.  - Continue to trend cardiac enzymes.   - Obtain TTE.   - NPO after midnight.   - Nuclear stress test tomorrow.

## 2022-06-23 NOTE — ED CDU PROVIDER INITIAL DAY NOTE - NS ED ROS FT
ROS: CONTUSIONAL: Denies fever, chills, fatigue, wt loss. HEAD: Denies trauma, HA, Dizziness. EYE: Denies Acute visual changes, diplopia. ENMT: Denies change in hearing, tinnitus, epistaxis, difficulty swallowing, sore throat. CARDIO: +CP Denies palpitations, edema. RESP: Denies Cough, SOB , Diff breathing, hemoptysis. GI: Denies N/V, ABD pain, change in bowel movement. URINARY: Denies difficulty urinating, pelvic pain. MS:  Denies joint pain, back pain, weakness, decreased ROM, swelling. NEURO: Denies change in gait, seizures, loss of sensation, dizziness, confusion LOC.  PSY: NO SI/HI. SKIN: Denies Rash, bruising.

## 2022-06-23 NOTE — ED CDU PROVIDER INITIAL DAY NOTE - OBJECTIVE STATEMENT
83 y/o male hx dementia, htn, prostate cancer in remission  shunt for hydrocephalus c/o 3-4 hours of resolved generalized chest pressure across chest. Was sitting there not doing anythign when it started, not episodic was constant but gradually decreasing. no sob, no diaphoresis. no hx of cardiac disease in pt (father had MI), no card w/u in past. no f/c, no abd pain. no other complaints. took asa pta. no hx dvt/pe.     ROS: No fever/chills. No eye pain/changes in vision, No ear pain/sore throat/dysphagia, No palpitations. No SOB/cough/. No abdominal pain, N/V/D, no black/bloody bm. No dysuria/frequency/discharge, No headache. No Dizziness.    No rashes or breaks in skin. No numbness/tingling/weakness.

## 2022-06-23 NOTE — ED PROVIDER NOTE - PHYSICAL EXAMINATION
Gen: No acute distress, non toxic  HEENT: Mucous membranes moist, pink conjunctivae, EOMI  CV: RRR, nl s1/s2. equal pulses b/l  Resp: CTAB, normal rate and effort  GI: Abdomen soft, NT, ND. No rebound, no guarding  : No CVAT  Neuro: A&O x 3, moving all 4 extremities  MSK: No spine or joint tenderness to palpation. no swelling b/l LE  Skin: No rashes. intact and perfused.

## 2022-06-23 NOTE — CONSULT NOTE ADULT - SUBJECTIVE AND OBJECTIVE BOX
[facility]                                              CARDIOLOGY AT [place] [address]                                             Telephone: [phone]. Fax:[phone]                                                       CARDIOLOGY CONSULTATION NOTE                                                                                             History obtained by: Patient and medical record  Community Cardiologist: None   obtained: Yes [  ] No [ x ]  Reason for Consultation: Chest Pain  Available out pt records reviewed: Yes [ x ] No [  ]    Chief complaint:    Patient is a 82y old  Male who presents with a chief complaint of chest pain.    HPI: Patient is a 83 y/o M with a HTN, HLD, dementia, and brain tumor s/p  shunt who presented to the ED with complaints of chest pain. Patient states that he was sitting and watching TV when he suddenly began to experience left sided chest pressure. Patient states that it was a 8/10 left sided chest pressure that radiated across his chest, with no associated symptoms. Patient states that the symptoms were constant, but decreased in intensity. Patient at baseline with no chest pain or dyspnea on exertion. Patient denies any fevers, chills, SOB, syncope, near syncope, abdominal pain, N/V/D, headache, or dizziness.     PAST MEDICAL HISTORY      PAST SURGICAL HISTORY      SOCIAL HISTORY:  Lives with his wife  CIGARETTES: Former light smoker in his 20s  ALCOHOL: Rare EtOH  DRUGS: Denies    FAMILY HISTORY:    Family History of Cardiovascular Disease:  Yes [  ] No [  ]  Coronary Artery Disease in first degree relative: Yes [  ] No [  ]  Sudden Cardiac Death in First degree relative: Yes [  ] No [  ]    HOME MEDICATIONS:  acetaminophen 325 mg oral tablet: 2 tab(s) orally every 6 hours (24 May 2022 23:55)  amitriptyline 75 mg oral tablet: 1 tab(s) orally once a day (at bedtime) (24 May 2022 23:55)  amLODIPine 5 mg oral tablet: 1 tab(s) orally once a day (24 May 2022 23:55)  polyethylene glycol 3350 oral powder for reconstitution: 17 gram(s) orally 2 times a day (24 May 2022 23:55)  senna oral tablet: 2 tab(s) orally once a day (at bedtime) (24 May 2022 23:55)  sertraline 100 mg oral tablet: 2 tab(s) orally once a day in the morning (24 May 2022 23:55)      CURRENT CARDIAC MEDICATIONS:      CURRENT OTHER MEDICATIONS:      ALLERGIES:   Allergy Status Unknown      REVIEW OF SYMPTOMS:   CONSTITUTIONAL: No fever, no chills, no weight loss, no weight gain, no fatigue   ENMT:  No vertigo; No sinus or throat pain  NECK: No pain or stiffness  CARDIOVASCULAR: AS PER HPI  RESPIRATORY: no Shortness of breath, no cough, no wheezing  : No dysuria, no hematuria   GI: No dark color stool, no nausea, no diarrhea, no constipation, no abdominal pain   NEURO: No headache, no slurred speech   MUSCULOSKELETAL: No joint pain or swelling; No muscle, back, or extremity pain  PSYCH: No agitation, no anxiety.    ALL OTHER REVIEW OF SYSTEMS ARE NEGATIVE.    VITAL SIGNS:  T(C): 36.7 (06-23-22 @ 09:49), Max: 36.7 (06-23-22 @ 09:49)  T(F): 98.1 (06-23-22 @ 09:49), Max: 98.1 (06-23-22 @ 09:49)  HR: 86 (06-23-22 @ 09:49) (86 - 86)  BP: 150/51 (06-23-22 @ 09:49) (150/51 - 150/51)  RR: 20 (06-23-22 @ 09:49) (20 - 20)  SpO2: 96% (06-23-22 @ 09:49) (96% - 96%)    INTAKE AND OUTPUT:       PHYSICAL EXAM:  Constitutional: Comfortable . No acute distress.   HEENT: Atraumatic and normocephalic , neck is supple . no JVD. No carotid bruit.  CNS: A&Ox3. No focal deficits.   Respiratory: CTAB, unlabored   Cardiovascular: RRR normal s1 s2. . No rubs or gallop. II/VI systolic murmur lsb  Gastrointestinal: Soft, non-tender. +Bowel sounds.   Extremities: 2+ Peripheral Pulses, No clubbing, cyanosis, or edema  Psychiatric: Calm . no agitation.   Skin: Warm and dry, no ulcers on extremities     LABS:  ( 23 Jun 2022 11:29 )  Troponin T  <0.01,  CPK  X    , CKMB  X    , BNP X                                  12.1   9.05  )-----------( 278      ( 23 Jun 2022 11:29 )             35.9     06-23    141  |  102  |  26.3<H>  ----------------------------<  107<H>  4.3   |  26.0  |  1.01    Ca    9.5      23 Jun 2022 11:29    TPro  6.8  /  Alb  4.3  /  TBili  <0.2<L>  /  DBili  x   /  AST  117<H>  /  ALT  88<H>  /  AlkPhos  47  06-23        INTERPRETATION OF TELEMETRY: SR    ECG: NSR, no acute ischemic changes  Prior ECG: Yes [  ] No [  ]    RADIOLOGY & ADDITIONAL STUDIES:    X-ray:    CT scan:   MRI:   US:

## 2022-06-23 NOTE — ED PROVIDER NOTE - CLINICAL SUMMARY MEDICAL DECISION MAKING FREE TEXT BOX
very well appaering, resolved atypical chest pain. ekg non ishcemic, will check trops x2. likely close cards f/u.

## 2022-06-23 NOTE — ED ADULT NURSE NOTE - NSFALLRSKOUTCOME_ED_ALL_ED
If she was not restarted on Coumadin go to what she was on now for the current regimen and do a pro time on Monday, August 26   Universal Safety Interventions

## 2022-06-23 NOTE — ED CDU PROVIDER INITIAL DAY NOTE - ATTENDING APP SHARED VISIT CONTRIBUTION OF CARE
I, Choco Aldana, have personally performed a face to face diagnostic evaluation on this patient. I have reviewed the LUCIO note and agree with the history, exam and plan of care, except as noted.    83 y/o male hx dementia, htn, prostate cancer in remission p/w chest pain that resolved upon arrival. EKG without ischiemic changes. trop negative. patient seen by cardiology, placed in obs for serial trop, echo, and stress test.

## 2022-06-23 NOTE — CONSULT NOTE ADULT - ASSESSMENT
A/P: Patient is a 83 y/o M with a HTN, HLD, dementia, and brain tumor s/p  shunt who presented to the ED with complaints of chest pain. Patient states that he was sitting and watching TV when he suddenly began to experience left sided chest pressure. Patient states that it was a 8/10 left sided chest pressure that radiated across his chest, with no associated symptoms. Patient states that the symptoms were constant, but decreased in intensity. Patient at baseline with no chest pain or dyspnea on exertion. Patient denies any fevers, chills, SOB, syncope, near syncope, abdominal pain, N/V/D, headache, or dizziness.   Troponin negative x 1

## 2022-06-23 NOTE — ED PROVIDER NOTE - OBJECTIVE STATEMENT
81 y/o male hx dementia, htn, prostate cancer in remission  shunt for hydrocephalus c/o 3-4 hours of resolved generalized chest pressure across chest. Was sitting there not doing anythign when it started, not episodic was constant but graduallly decreasing. no sob, no diaphoresis. no hx of cardiac disease in pt (father had MI), no card w/u in past. no f/c, no abd pain. no other complaints. took asa pta. no hx dvt/pe.     ROS: No fever/chills. No eye pain/changes in vision, No ear pain/sore throat/dysphagia, No palpitations. No SOB/cough/. No abdominal pain, N/V/D, no black/bloody bm. No dysuria/frequency/discharge, No headache. No Dizziness.    No rashes or breaks in skin. No numbness/tingling/weakness.

## 2022-06-23 NOTE — ED ADULT NURSE NOTE - OBJECTIVE STATEMENT
Spoke with patient to see if he wanted to come in today with Dr Andrew Velasquez, patient declined but was glad we called  Pt came to ED with c/o CP. PT currently denies pain. A&Ox4, communicates effectively, ambulates independently without assist. Denies n/v, dizziness. Skin warm dry and intact. Placed on continuous cardiac monitor. NSR. VS stable. All labs collected and sent as per order. Nursing will continue to monitor. NAD at this time. safety maintained.

## 2022-06-23 NOTE — ED CDU PROVIDER INITIAL DAY NOTE - NS ED ATTENDING STATEMENT MOD
This was a shared visit with the LUCIO. I reviewed and verified the documentation and independently performed the documented:

## 2022-06-23 NOTE — CONSULT NOTE ADULT - NS ATTEND AMEND GEN_ALL_CORE FT
Chest pain: Serial CE, EKG, TTE; NPO after midnight. Nuclear stress test tomorrow.  Hypertension:  Continue  Amlodipine. If BP is still elevated, can increase to 10mg PO daily

## 2022-06-23 NOTE — ED ADULT NURSE NOTE - CHPI ED NUR SYMPTOMS NEG
no back pain/no chest pain/no chills/no diaphoresis/no dizziness/no nausea/no shortness of breath/no syncope

## 2022-06-23 NOTE — ED ADULT TRIAGE NOTE - CHIEF COMPLAINT QUOTE
Patient presents to ER C/O CP episode this AM,  reports patient almost subsided, 2/10 currently, denies N/V, no dizziness, resp even/unlabored. ASA take at prior to Hospital

## 2022-06-24 VITALS
HEART RATE: 68 BPM | DIASTOLIC BLOOD PRESSURE: 76 MMHG | SYSTOLIC BLOOD PRESSURE: 129 MMHG | OXYGEN SATURATION: 94 % | TEMPERATURE: 98 F | RESPIRATION RATE: 18 BRPM

## 2022-06-24 LAB
CHOLEST SERPL-MCNC: 218 MG/DL — HIGH
HDLC SERPL-MCNC: 92 MG/DL — SIGNIFICANT CHANGE UP
LIPID PNL WITH DIRECT LDL SERPL: 103 MG/DL — HIGH
NON HDL CHOLESTEROL: 126 MG/DL — SIGNIFICANT CHANGE UP
TRIGL SERPL-MCNC: 115 MG/DL — SIGNIFICANT CHANGE UP

## 2022-06-24 PROCEDURE — C8929: CPT

## 2022-06-24 PROCEDURE — 84484 ASSAY OF TROPONIN QUANT: CPT

## 2022-06-24 PROCEDURE — 93005 ELECTROCARDIOGRAM TRACING: CPT

## 2022-06-24 PROCEDURE — 99284 EMERGENCY DEPT VISIT MOD MDM: CPT

## 2022-06-24 PROCEDURE — 80061 LIPID PANEL: CPT

## 2022-06-24 PROCEDURE — 87637 SARSCOV2&INF A&B&RSV AMP PRB: CPT

## 2022-06-24 PROCEDURE — 78452 HT MUSCLE IMAGE SPECT MULT: CPT

## 2022-06-24 PROCEDURE — 36415 COLL VENOUS BLD VENIPUNCTURE: CPT

## 2022-06-24 PROCEDURE — 99217: CPT

## 2022-06-24 PROCEDURE — 71045 X-RAY EXAM CHEST 1 VIEW: CPT

## 2022-06-24 PROCEDURE — 93017 CV STRESS TEST TRACING ONLY: CPT

## 2022-06-24 PROCEDURE — 78452 HT MUSCLE IMAGE SPECT MULT: CPT | Mod: 26

## 2022-06-24 PROCEDURE — A9500: CPT

## 2022-06-24 PROCEDURE — 93018 CV STRESS TEST I&R ONLY: CPT

## 2022-06-24 PROCEDURE — 80053 COMPREHEN METABOLIC PANEL: CPT

## 2022-06-24 PROCEDURE — 93016 CV STRESS TEST SUPVJ ONLY: CPT

## 2022-06-24 PROCEDURE — 99285 EMERGENCY DEPT VISIT HI MDM: CPT | Mod: 25

## 2022-06-24 PROCEDURE — 85025 COMPLETE CBC W/AUTO DIFF WBC: CPT

## 2022-06-24 PROCEDURE — G0378: CPT

## 2022-06-24 RX ORDER — AMLODIPINE BESYLATE 2.5 MG/1
10 TABLET ORAL DAILY
Refills: 0 | Status: DISCONTINUED | OUTPATIENT
Start: 2022-06-24 | End: 2022-06-24

## 2022-06-24 RX ORDER — AMLODIPINE BESYLATE 2.5 MG/1
5 TABLET ORAL ONCE
Refills: 0 | Status: DISCONTINUED | OUTPATIENT
Start: 2022-06-24 | End: 2022-06-27

## 2022-06-24 RX ORDER — AMLODIPINE BESYLATE 2.5 MG/1
5 TABLET ORAL DAILY
Refills: 0 | Status: DISCONTINUED | OUTPATIENT
Start: 2022-06-24 | End: 2022-06-27

## 2022-06-24 RX ADMIN — MEMANTINE HYDROCHLORIDE 25 MILLIGRAM(S): 10 TABLET ORAL at 11:53

## 2022-06-24 RX ADMIN — AMLODIPINE BESYLATE 5 MILLIGRAM(S): 2.5 TABLET ORAL at 06:01

## 2022-06-24 RX ADMIN — GABAPENTIN 600 MILLIGRAM(S): 400 CAPSULE ORAL at 06:00

## 2022-06-24 RX ADMIN — GABAPENTIN 600 MILLIGRAM(S): 400 CAPSULE ORAL at 13:47

## 2022-06-24 RX ADMIN — PANTOPRAZOLE SODIUM 40 MILLIGRAM(S): 20 TABLET, DELAYED RELEASE ORAL at 06:22

## 2022-06-24 RX ADMIN — Medication 10 MILLIGRAM(S): at 06:02

## 2022-06-24 NOTE — ED CDU PROVIDER DISPOSITION NOTE - CLINICAL COURSE
PT presnting for evaluation of chest pain. Seen by cardiology, trop negative x3, ekg wnl, negative stress/nst. Cleared by cardiology for dc. Reviewed w/u results with pt. Pt reports feeling much improved. Comfortable with plan for d/c. Pt agrees to f/u with cards Return instructions given, questions answered.

## 2022-06-24 NOTE — ED CDU PROVIDER DISPOSITION NOTE - ATTENDING CONTRIBUTION TO CARE
I personally saw the patient with the resident, and completed the key components of the history and physical exam. I then discussed the management plan with the resident.    negative stress test; labs negative; ok for d/c with outpt f/u

## 2022-06-24 NOTE — ED CDU PROVIDER SUBSEQUENT DAY NOTE - ATTENDING APP SHARED VISIT CONTRIBUTION OF CARE
I, [place] Rodrigez, participated in the care of this patient with the ACP. I discussed the history and physical exam findings as well as lab results and plan of care with the ACP. I agree with ACP's history, physical and assessment.  pending NST

## 2022-06-24 NOTE — ED CDU PROVIDER SUBSEQUENT DAY NOTE - MEDICAL DECISION MAKING DETAILS
PT with CP placed in obs for Diagnostic uncertainty.   trop x 3 negative   pt still remain elevated BP will increased the amlodipine to 10 mg as per cardiology   Echo W,o significant finding   pending in AM NST PT with CP placed in obs for Diagnostic uncertainty.   trop x 3 negative   HTN BP in AM improved  will continue check the BP cont amlodipine  5mg   Echo W,o significant finding   pending in AM NST

## 2022-06-24 NOTE — ED CDU PROVIDER DISPOSITION NOTE - NSFOLLOWUPCLINICS_GEN_ALL_ED_FT
[facility]  Cardiology  [address], [address]  [place] [address]  Phone: [phone]  Fax:

## 2022-06-24 NOTE — PROGRESS NOTE ADULT - SUBJECTIVE AND OBJECTIVE BOX
[facility]                                                         CARDIOLOGY AT [facility]                                                                  [address] [id number] [place] [address]- [facility]                                                         Telephone: [phone]. Fax:[phone]                                                                             PROGRESS NOTE    Reason for follow up: Chest Pain  Update: Patient's chest pain has completely resolved at this time. Troponins remain negative. Echo with normal BiV function.       Review of symptoms:   Cardiac:  No chest pain. No dyspnea. No palpitations.  Respiratory: no cough. No dyspnea  Gastrointestinal: No diarrhea. No abdominal pain. No bleeding.   Neuro: No focal neuro complaints.    Vitals:  T(C): 36.4 (06-24-22 @ 05:31), Max: 36.7 (06-23-22 @ 09:49)  HR: 66 (06-24-22 @ 05:31) (64 - 90)  BP: 153/77 (06-24-22 @ 05:31) (148/80 - 200/79)  RR: 18 (06-24-22 @ 05:31) (18 - 20)  SpO2: 94% (06-24-22 @ 05:31) (93% - 98%)  Wt(kg): --  I&O's Summary    Weight (kg): 77.1 (06-23 @ 09:49)    PHYSICAL EXAM:  Appearance: Comfortable. No acute distress  HEENT:  Atraumatic. Normocephalic.  Normal oral mucosa  Neurologic: A & O x 3, no gross focal deficits.  Cardiovascular: RRR S1 S2, No murmur, no rubs/gallops. No JVD  Respiratory: Lungs clear to auscultation, unlabored   Gastrointestinal:  Soft, Non-tender, + BS  Lower Extremities: 2+ Peripheral Pulses, No clubbing, cyanosis, or edema  Psychiatry: Patient is calm. No agitation.   Skin: warm and dry.    CURRENT CARDIAC MEDICATIONS:  amLODIPine   Tablet 5 milliGRAM(s) Oral daily      CURRENT OTHER MEDICATIONS:  acetaminophen   Oral Tab/Cap - Peds. 650 milliGRAM(s) Oral every 6 hours PRN Mild Pain (1 - 3)  amitriptyline Oral Tab/Cap - Peds 75 milliGRAM(s) Oral at bedtime  diVALproex  milliGRAM(s) Oral at bedtime  donepezil 10 milliGRAM(s) Oral at bedtime  gabapentin 600 milliGRAM(s) Oral every 8 hours  memantine 25 milliGRAM(s) Oral daily  sertraline 200 milliGRAM(s) Oral daily  pantoprazole    Tablet 40 milliGRAM(s) Oral before breakfast  senna 2 Tablet(s) Oral at bedtime  atorvastatin 20 milliGRAM(s) Oral at bedtime  oxybutynin 10 milliGRAM(s) Oral two times a day      LABS:	 	  ( 23 Jun 2022 21:53 )  Troponin T  <0.01,  CPK  X    , CKMB  X    , BNP X        , ( 23 Jun 2022 16:23 )  Troponin T  <0.01,  CPK  X    , CKMB  X    , BNP X        , ( 23 Jun 2022 11:29 )  Troponin T  <0.01,  CPK  X    , CKMB  X    , BNP X                                  12.1   9.05  )-----------( 278      ( 23 Jun 2022 11:29 )             35.9     06-23    141  |  102  |  26.3<H>  ----------------------------<  107<H>  4.3   |  26.0  |  1.01    Ca    9.5      23 Jun 2022 11:29    TPro  6.8  /  Alb  4.3  /  TBili  <0.2<L>  /  DBili  x   /  AST  117<H>  /  ALT  88<H>  /  AlkPhos  47  06-23      Lipid Profile: Date: 06-24 @ 06:58  Total cholesterol 218; Direct LDL: --; HDL: 92; Triglycerides:115    HgA1c:   TSH:     TELEMETRY: SR  ECG:    DIAGNOSTIC TESTING:  [ ] Echocardiogram:   < from: TTE Echo Complete w/ Contrast w/ Doppler (06.23.22 @ 15:01) >  PHYSICIAN INTERPRETATION:  Left Ventricle: The left ventricular internal cavity size is normal. Left   ventricular wall thickness is normal.  Global LV systolic function was normal. Left ventricular ejection   fraction, by visual estimation, is 60 to 65%. Spectral Doppler shows   impaired relaxation pattern of left ventricular myocardial filling (Grade   Idiastolic dysfunction).  Right Ventricle: Normal right ventricular size and function. The right   ventricular size is normal. RV systolic function is normal.  Left Atrium: Normal left atrial size.  Right Atrium: Normal right atrial size.  Pericardium: There is no evidence of pericardial effusion.  Mitral Valve: Mild thickening of the anterior and posterior mitral valve   leaflets. Trace mitral valve regurgitation is seen.  Tricuspid Valve: The tricuspid valve is normal in structure. Trivial   tricuspid regurgitation is visualized.  Aortic Valve: The aortic valve is trileaflet. Sclerotic aortic valve with   normal opening. Mild aortic valve regurgitation is seen.  Pulmonic Valve: The pulmonic valve is normal. Trace pulmonic valve   regurgitation.  Aorta: The aortic root is normal in size and structure.  Pulmonary Artery: The main pulmonary artery is normal in size.  Venous: The inferior vena cava was normal sized, with respiratory size   variation greater than 50%.      Summary:   1. Normal left atrial size.   2. Normal wall motion. Left ventricular ejection fraction, by visual   estimation, is 60 to 65%. Grade I diastolic dysfunction.   3. Normal right atrial size.   4. Normal right ventricular size and function.   5. Mild aortic regurgitation.   6. There is no evidence of pericardial effusion.    MD Timi, RPVI Electronically signed on 6/23/2022 at 4:01:02 PM    < end of copied text >    [ ]  Catheterization:  [ ] Stress Test:    OTHER:

## 2022-06-24 NOTE — ED CDU PROVIDER DISPOSITION NOTE - NSFOLLOWUPINSTRUCTIONS_ED_ALL_ED_FT
- Follow up with cardiology  - Bring any results with you to the appointment.  - Return to the ED for any new or worsening symptoms.  - Take Tylenol (Acetaminophen) 650mg or Motrin (Ibuprofen/Advil) 600mg every 6 hours as needed for pain.     Chest Pain    Chest pain can be caused by many different conditions which may or may not be dangerous. Causes include heartburn, lung infections, heart attack, blood clot in lungs, skin infections, strain or damage to muscle, cartilage, or bones, etc. In addition to a history and physical examination, an electrocardiogram (ECG) or other lab tests may have been performed to determine the cause of your chest pain. Follow up with your primary care provider or with a cardiologist as instructed.     SEEK IMMEDIATE MEDICAL CARE IF YOU HAVE ANY OF THE FOLLOWING SYMPTOMS: worsening chest pain, coughing up blood, unexplained back/neck/jaw pain, severe abdominal pain, dizziness or lightheadedness, fainting, shortness of breath, sweaty or clammy skin, vomiting, or racing heart beat. These symptoms may represent a serious problem that is an emergency. Do not wait to see if the symptoms will go away. Get medical help right away. Call 911 and do not drive yourself to the hospital.

## 2022-06-24 NOTE — ED ADULT NURSE REASSESSMENT NOTE - NS ED NURSE REASSESS COMMENT FT1
Assumed care of the pt at 0745. Verbal report received from Yumiko MURGUIA Obs. Patient currently in NST. Patient to be transferred to [facility] after test. Danis chopra
VSS. NSR on CM.
Patient transferred back from NST to A6L. Patient A&Ox4. Patient remains asymptomatic. Denies any CP/SOB or dizziness. NSR on CM. VSS. PIV patent. Patient remains NPO pending NST read. Patient in understanding of plan of care. Patient with no further questions for the RN. Resting in comfort. Call bell within reach and encouraged to use when assistance needed. Will continue to monitor.
Received patient from [facility] RN.  Pt AxO4, VSS.  Pt denies chest pain/SOB at this time.  Cardio NSR on cardiac monitor.   IV insertion site -, flushing without difficulty .Denies  chest pain ,denies numbness  or any discomfort  Safety measures taken, bed in low position, call bell within reach, side rails up x2.  Plan of care explained.  Pt verbalized understanding.  Will continue to monitor.

## 2022-06-24 NOTE — PROGRESS NOTE ADULT - NS ATTEND AMEND GEN_ALL_CORE FT
Chest pain:  Troponin negative x 3. - Echo with normal EF, no RWMA.  Nuclear Stress Test today, if no ischemia can be discharged home.

## 2022-06-24 NOTE — ED ADULT NURSE REASSESSMENT NOTE - COMFORT CARE
plan of care explained/repositioned/wait time explained
assisted to bathroom/assisted to bedpan/darkened lights/plan of care explained/po fluids offered/side rails up/wait time explained
ambulated to bathroom/plan of care explained/repositioned/wait time explained

## 2022-06-24 NOTE — ED CDU PROVIDER SUBSEQUENT DAY NOTE - HISTORY
seen the pt early at the bed side he denies any chest pain ,. Trop x 3 negative , pt remain mild hypertensive asymptomatic as per cardiology will raise the amlodipine from 5 to 10 mg. pending AM NSt test seen the pt early at the bed side he denies any chest pain ,. Trop x 3 negative , pt remain mild hypertensive no the AM BP is improved will continue Monitoring the Bp and continue amlodipine 5mg

## 2022-06-24 NOTE — PROGRESS NOTE ADULT - PROBLEM SELECTOR PLAN 1
- Troponin negative x 3  - Echo with normal EF, no RWMA.   - Nuclear Stress Test today, if no ischemia can be discharged home.

## 2022-06-24 NOTE — PROGRESS NOTE ADULT - ASSESSMENT
A/P: Patient is a 83 y/o M with a HTN, HLD, dementia, and brain tumor s/p  shunt who presented to the ED with complaints of chest pain. Patient states that he was sitting and watching TV when he suddenly began to experience left sided chest pressure. Patient states that it was a 8/10 left sided chest pressure that radiated across his chest, with no associated symptoms. Patient states that the symptoms were constant, but decreased in intensity. Patient at baseline with no chest pain or dyspnea on exertion. Patient denies any fevers, chills, SOB, syncope, near syncope, abdominal pain, N/V/D, headache, or dizziness.   Troponin negative x 3

## 2022-06-30 ENCOUNTER — APPOINTMENT (OUTPATIENT)
Dept: PULMONOLOGY | Facility: CLINIC | Age: 82
End: 2022-06-30

## 2022-08-15 PROBLEM — D49.6 NEOPLASM OF UNSPECIFIED BEHAVIOR OF BRAIN: Chronic | Status: ACTIVE | Noted: 2022-06-24

## 2022-08-15 PROBLEM — I10 ESSENTIAL (PRIMARY) HYPERTENSION: Chronic | Status: ACTIVE | Noted: 2022-06-24

## 2022-08-15 PROBLEM — E78.5 HYPERLIPIDEMIA, UNSPECIFIED: Chronic | Status: ACTIVE | Noted: 2022-06-24

## 2022-08-25 ENCOUNTER — APPOINTMENT (OUTPATIENT)
Dept: ORTHOPEDIC SURGERY | Facility: CLINIC | Age: 82
End: 2022-08-25

## 2022-08-25 VITALS — HEIGHT: 70 IN | WEIGHT: 170 LBS | BODY MASS INDEX: 24.34 KG/M2

## 2022-08-25 PROCEDURE — 99213 OFFICE O/P EST LOW 20 MIN: CPT | Mod: 25

## 2022-08-25 PROCEDURE — 20550 NJX 1 TENDON SHEATH/LIGAMENT: CPT

## 2022-08-25 NOTE — PROCEDURE
[Tendon Sheath] : tendon sheath [Right] : of the right [Other: ____] : [unfilled] [Pain] : pain [Inflammation] : inflammation [Alcohol] : alcohol [Ethyl Chloride sprayed topically] : ethyl chloride sprayed topically [Sterile technique used] : sterile technique used [___ cc    1%] : Lidocaine ~Vcc of 1%  [___ cc    10mg] : Triamcinolone (Kenalog) ~Vcc of 10 mg  [Risks, benefits, alternatives discussed / Verbal consent obtained] : the risks benefits, and alternatives have been discussed, and verbal consent was obtained

## 2022-08-25 NOTE — HISTORY OF PRESENT ILLNESS
[10] : 10 [Retired] : Work status: retired [de-identified] : Mr. Mariano Adler, 80 yo M, being followed for right wrist ECU tendinitis. 3 months s/p ECU CSI with only 6-8 weeks of releif. This was the second CSI. s/p distal ulnar joint replacement.  [] : no [de-identified] : no treatment

## 2022-09-02 ENCOUNTER — APPOINTMENT (OUTPATIENT)
Dept: PULMONOLOGY | Facility: CLINIC | Age: 82
End: 2022-09-02

## 2022-09-02 VITALS
WEIGHT: 177 LBS | OXYGEN SATURATION: 95 % | HEART RATE: 81 BPM | DIASTOLIC BLOOD PRESSURE: 60 MMHG | TEMPERATURE: 97.1 F | BODY MASS INDEX: 25.34 KG/M2 | SYSTOLIC BLOOD PRESSURE: 118 MMHG | HEIGHT: 70 IN

## 2022-09-02 PROCEDURE — 99214 OFFICE O/P EST MOD 30 MIN: CPT

## 2022-09-02 RX ORDER — AMOXICILLIN 500 MG/1
500 CAPSULE ORAL
Qty: 28 | Refills: 0 | Status: DISCONTINUED | COMMUNITY
Start: 2022-05-12 | End: 2022-09-02

## 2022-09-02 RX ORDER — PREDNISONE 5 MG/1
5 TABLET ORAL
Qty: 120 | Refills: 1 | Status: DISCONTINUED | COMMUNITY
Start: 2022-06-17 | End: 2022-09-02

## 2022-09-02 RX ORDER — ESOMEPRAZOLE MAGNESIUM 40 MG/1
40 CAPSULE, DELAYED RELEASE ORAL DAILY
Refills: 0 | Status: ACTIVE | COMMUNITY

## 2022-09-02 RX ORDER — AMITRIPTYLINE HYDROCHLORIDE 75 MG/1
75 TABLET, FILM COATED ORAL DAILY
Refills: 0 | Status: ACTIVE | COMMUNITY

## 2022-09-02 NOTE — HISTORY OF PRESENT ILLNESS
[Never] : never [TextBox_4] : 82 male feels slight improved  \par ++cough  ran out of steroids  1 month   ago\par ++phlegm yellow\par no fever no pain\par shwetha improvement with symbicort and no longer using meds

## 2022-09-02 NOTE — DISCUSSION/SUMMARY
[FreeTextEntry1] : Divnidia is a persistent cough.  He has been off the prednisone for about 3 weeks and the cough is slightly worsened.  He still coughs up some yellow phlegm.  Denies any chest pain.  His exam reveals generally clear lungs and hesitant to start him on prednisone again.  The patient said the cough for several months.  I have asked him to obtain a sinus x-rays to see if there is any abnormalities and a follow-up chest x-ray.  Also requested a sputum for culture.  At this time we will restart the Symbicort 162 sprays twice a day and the Tessalon Perles 1 tablet twice a day.  Patient and his  understand and agree with this plan of care.\par The patient understands and agrees with plan of care.\par Today's office visit encompassed 32 minutes. I conducted an extensive history ,physical exam and reviewed diagnosis and treatment options  including diagnostic tests,radiologic studies including  cat scans  and the use of prescription medication.

## 2022-09-02 NOTE — REASON FOR VISIT
[Follow-Up] : a follow-up visit [Cough] : cough [Pulmonary Nodules] : pulmonary nodules [Wheezing] : wheezing [TextBox_44] : 2 months,

## 2022-09-06 ENCOUNTER — NON-APPOINTMENT (OUTPATIENT)
Age: 82
End: 2022-09-06

## 2022-09-08 ENCOUNTER — NON-APPOINTMENT (OUTPATIENT)
Age: 82
End: 2022-09-08

## 2022-09-12 ENCOUNTER — NON-APPOINTMENT (OUTPATIENT)
Age: 82
End: 2022-09-12

## 2022-09-15 ENCOUNTER — APPOINTMENT (OUTPATIENT)
Dept: PULMONOLOGY | Facility: CLINIC | Age: 82
End: 2022-09-15

## 2022-09-15 VITALS
HEART RATE: 81 BPM | BODY MASS INDEX: 25.34 KG/M2 | TEMPERATURE: 97.1 F | WEIGHT: 177 LBS | OXYGEN SATURATION: 96 % | DIASTOLIC BLOOD PRESSURE: 72 MMHG | SYSTOLIC BLOOD PRESSURE: 118 MMHG | HEIGHT: 70 IN

## 2022-09-15 PROCEDURE — 99214 OFFICE O/P EST MOD 30 MIN: CPT

## 2022-09-15 NOTE — REASON FOR VISIT
[Cough] : cough [Pulmonary Nodules] : pulmonary nodules [Wheezing] : wheezing [Spouse] : spouse [TextBox_44] : is presenting for a 2 week follow up to go over xray's.  Patients wife complains that he has a chronic cough and wheezing when he lays down and in the morning, this has been occurring for approximately 7 months.

## 2022-09-15 NOTE — DISCUSSION/SUMMARY
[FreeTextEntry1] : Mr Adler has a chronic cough for approximately 3 months with yellow phlegm.  It appears the Symbicort made minimal improvement.  I have asked him to obtain a CAT scan of the chest as it is possible he has bronchiectasis and this is causing a chronic cough.  Although there is no dominant bacteria any he has not been on any antibiotics so we will try Levaquin 500 mg 1 tablet daily for the next 7 days and see if any relief occurs.  There is explained to the daughter and the patient and all are in agreement.\par The patient understands and agrees with plan of care.\par Today's office visit encompassed 32 minutes. I conducted an extensive history ,physical exam and reviewed diagnosis and treatment options  including diagnostic tests,radiologic studies including  cat scans  and the use of prescription medication.

## 2022-09-15 NOTE — HISTORY OF PRESENT ILLNESS
[Never] : never [TextBox_4] : 82 male ch cough \par feels good restarted symbicort and feels minimally better\par main cough in am upon arising\par no fever no hemoptysis\par no malodor  yellow

## 2022-09-15 NOTE — PROCEDURE
[FreeTextEntry1] : Sinus x-rays 9/2/2022 are normal.  Chest x-ray 9/2/2022 normal.  Sputum culture normal dat.

## 2022-10-03 ENCOUNTER — NON-APPOINTMENT (OUTPATIENT)
Age: 82
End: 2022-10-03

## 2022-10-20 ENCOUNTER — APPOINTMENT (OUTPATIENT)
Dept: PULMONOLOGY | Facility: CLINIC | Age: 82
End: 2022-10-20

## 2022-10-20 VITALS
HEART RATE: 85 BPM | OXYGEN SATURATION: 98 % | HEIGHT: 70 IN | TEMPERATURE: 97.4 F | DIASTOLIC BLOOD PRESSURE: 70 MMHG | BODY MASS INDEX: 26.63 KG/M2 | WEIGHT: 186 LBS | SYSTOLIC BLOOD PRESSURE: 142 MMHG

## 2022-10-20 DIAGNOSIS — R91.8 OTHER NONSPECIFIC ABNORMAL FINDING OF LUNG FIELD: ICD-10-CM

## 2022-10-20 PROCEDURE — 99214 OFFICE O/P EST MOD 30 MIN: CPT

## 2022-10-20 NOTE — PHYSICAL EXAM
[No Acute Distress] : no acute distress [Normal Oropharynx] : normal oropharynx [Normal Appearance] : normal appearance [No Neck Mass] : no neck mass [Normal Rate/Rhythm] : normal rate/rhythm [Normal S1, S2] : normal s1, s2 [No Murmurs] : no murmurs [No Resp Distress] : no resp distress [Clear to Auscultation Bilaterally] : clear to auscultation bilaterally [No Abnormalities] : no abnormalities [Benign] : benign [Normal Gait] : normal gait [No Clubbing] : no clubbing [No Cyanosis] : no cyanosis [No Edema] : no edema [FROM] : FROM [Normal Color/ Pigmentation] : normal color/ pigmentation [No Focal Deficits] : no focal deficits [Oriented x3] : oriented x3 [Normal Affect] : normal affect [TextBox_68] : Decreased breath sounds clear all areas

## 2022-10-20 NOTE — HISTORY OF PRESENT ILLNESS
[Former] : former [< 20 pack-years] : < 20 pack-years [Never] : never [TextBox_4] : 82 male hx ch cough  for fu visit\par tx levaquin  po and now on amoxicillin for dental work\par minimal improvement with abx\par some improvement with tessalon perles\par worse with lying flat\par no chest pain no tightness no fever ++phlegm yellow

## 2022-10-20 NOTE — PROCEDURE
[FreeTextEntry1] : CT chest performed 9/23/2022 diffuse airway thickening with secretions.  Fluid in the esophagus suggesting reflux or dysfunction no change 1 cm groundglass right apical nodule.

## 2022-10-20 NOTE — REASON FOR VISIT
[Follow-Up] : a follow-up visit [Cough] : cough [Pulmonary Nodules] : pulmonary nodules [Wheezing] : wheezing [Spouse] : spouse [TextBox_44] : 1 month

## 2022-10-20 NOTE — DISCUSSION/SUMMARY
[FreeTextEntry1] : Mr. Adler is a chronic cough.  The esophagus is shown to be retaining fluid on CT chest.  After discussion with the patient and his daughter he has had esophageal disease and strictures in the past.  This certainly could be a cause of his persistent cough.  I would like him to continue the benzonatate 1 tablet 2-3 times a day as needed.  He is not using the Symbicort and although I think obstructive airway disease is a minor component I think would benefit him from using it 2 sprays twice a day all the time.  There is no change in the right upper lobe groundglass nodule from April 2022.  I favor follow-up CAT scan in 6 to 12 months.  The patient has been referred to [facility] for evaluation for esophageal therapy.\par The patient understands and agrees with plan of care.\par Today's office visit encompassed 32 minutes. I conducted an extensive history ,physical exam and reviewed diagnosis and treatment options  including diagnostic tests,radiologic studies including  cat scans  and the use of prescription medication. n

## 2022-12-12 ENCOUNTER — APPOINTMENT (OUTPATIENT)
Dept: ORTHOPEDIC SURGERY | Facility: CLINIC | Age: 82
End: 2022-12-12

## 2022-12-12 VITALS — HEIGHT: 70 IN | BODY MASS INDEX: 26.63 KG/M2 | WEIGHT: 186 LBS

## 2022-12-12 DIAGNOSIS — Z78.9 OTHER SPECIFIED HEALTH STATUS: ICD-10-CM

## 2022-12-12 PROCEDURE — 99213 OFFICE O/P EST LOW 20 MIN: CPT | Mod: 25

## 2022-12-12 PROCEDURE — 20550 NJX 1 TENDON SHEATH/LIGAMENT: CPT

## 2022-12-12 NOTE — HISTORY OF PRESENT ILLNESS
[9] : 9 [de-identified] : Mr. Mariano Adler, 80 yo M, being followed for right wrist ECU tendinitis. He is 3.5 months Rt 3rd ECU CSI. \par  [FreeTextEntry1] : right wrist

## 2023-01-05 ENCOUNTER — NON-APPOINTMENT (OUTPATIENT)
Age: 83
End: 2023-01-05

## 2023-01-05 ENCOUNTER — APPOINTMENT (OUTPATIENT)
Dept: INTERNAL MEDICINE | Facility: CLINIC | Age: 83
End: 2023-01-05
Payer: MEDICARE

## 2023-01-05 VITALS
OXYGEN SATURATION: 97 % | SYSTOLIC BLOOD PRESSURE: 150 MMHG | DIASTOLIC BLOOD PRESSURE: 90 MMHG | HEART RATE: 92 BPM | HEIGHT: 70 IN | BODY MASS INDEX: 23.62 KG/M2 | WEIGHT: 165 LBS | TEMPERATURE: 98 F

## 2023-01-05 VITALS — DIASTOLIC BLOOD PRESSURE: 80 MMHG | SYSTOLIC BLOOD PRESSURE: 130 MMHG

## 2023-01-05 DIAGNOSIS — G62.9 POLYNEUROPATHY, UNSPECIFIED: ICD-10-CM

## 2023-01-05 DIAGNOSIS — Z87.891 PERSONAL HISTORY OF NICOTINE DEPENDENCE: ICD-10-CM

## 2023-01-05 DIAGNOSIS — Z87.898 PERSONAL HISTORY OF OTHER SPECIFIED CONDITIONS: ICD-10-CM

## 2023-01-05 DIAGNOSIS — R32 UNSPECIFIED URINARY INCONTINENCE: ICD-10-CM

## 2023-01-05 DIAGNOSIS — I10 ESSENTIAL (PRIMARY) HYPERTENSION: ICD-10-CM

## 2023-01-05 DIAGNOSIS — Z85.46 PERSONAL HISTORY OF MALIGNANT NEOPLASM OF PROSTATE: ICD-10-CM

## 2023-01-05 DIAGNOSIS — F03.90 UNSPECIFIED DEMENTIA W/OUT BEHAVIORAL DISTURBANCE: ICD-10-CM

## 2023-01-05 PROCEDURE — 99203 OFFICE O/P NEW LOW 30 MIN: CPT

## 2023-01-05 RX ORDER — GABAPENTIN 300 MG/1
300 CAPSULE ORAL TWICE DAILY
Refills: 0 | Status: DISCONTINUED | COMMUNITY
End: 2023-01-05

## 2023-01-05 RX ORDER — SERTRALINE HYDROCHLORIDE 100 MG/1
100 TABLET, FILM COATED ORAL TWICE DAILY
Refills: 0 | Status: ACTIVE | COMMUNITY

## 2023-01-05 RX ORDER — BUDESONIDE AND FORMOTEROL FUMARATE DIHYDRATE 160; 4.5 UG/1; UG/1
160-4.5 AEROSOL RESPIRATORY (INHALATION) TWICE DAILY
Qty: 3 | Refills: 1 | Status: DISCONTINUED | COMMUNITY
End: 2023-01-05

## 2023-01-05 RX ORDER — CHLORHEXIDINE GLUCONATE, 0.12% ORAL RINSE 1.2 MG/ML
0.12 SOLUTION DENTAL
Qty: 473 | Refills: 0 | Status: DISCONTINUED | COMMUNITY
Start: 2022-10-11 | End: 2023-01-05

## 2023-01-05 RX ORDER — AMLODIPINE BESYLATE 5 MG/1
5 TABLET ORAL DAILY
Refills: 0 | Status: DISCONTINUED | COMMUNITY
End: 2023-01-05

## 2023-01-05 RX ORDER — BENZONATATE 200 MG/1
200 CAPSULE ORAL
Qty: 60 | Refills: 3 | Status: DISCONTINUED | COMMUNITY
Start: 2022-06-09 | End: 2023-01-05

## 2023-01-05 RX ORDER — LEVOFLOXACIN 500 MG/1
500 TABLET, FILM COATED ORAL DAILY
Qty: 7 | Refills: 0 | Status: DISCONTINUED | COMMUNITY
Start: 2022-09-15 | End: 2023-01-05

## 2023-01-05 RX ORDER — ATORVASTATIN CALCIUM 80 MG/1
TABLET, FILM COATED ORAL
Refills: 0 | Status: DISCONTINUED | COMMUNITY
End: 2023-01-05

## 2023-01-05 RX ORDER — AZITHROMYCIN 250 MG/1
250 TABLET, FILM COATED ORAL
Qty: 6 | Refills: 0 | Status: DISCONTINUED | COMMUNITY
Start: 2022-10-11 | End: 2023-01-05

## 2023-01-05 RX ORDER — SENNOSIDES 8.6 MG TABLETS 8.6 MG/1
8.6 TABLET ORAL
Qty: 60 | Refills: 0 | Status: DISCONTINUED | COMMUNITY
Start: 2022-01-10 | End: 2023-01-05

## 2023-01-05 RX ORDER — ACETAMINOPHEN AND CODEINE PHOSPHATE 300; 30 MG/1; MG/1
300-30 TABLET ORAL
Qty: 12 | Refills: 0 | Status: DISCONTINUED | COMMUNITY
Start: 2022-10-16 | End: 2023-01-05

## 2023-01-05 NOTE — REVIEW OF SYSTEMS
[Negative] : Heme/Lymph [Lower Ext Edema] : no lower extremity edema [Paroxysmal Nocturnal Dyspnea] : no paroxysmal nocturnal dyspnea

## 2023-01-05 NOTE — HISTORY OF PRESENT ILLNESS
[Other: _____] : [unfilled] [FreeTextEntry1] : NOT A PHYSICAL [de-identified] : 82-year-old male with a history of dementia, hypertension and urinary incontinence who is here for an initial visit.  He is here with his wife.  All the history is from her.\par They moved back to [place] from the [facility] about 1 year ago.  They are reestablishing care here\par He was on amlodipine for his blood pressure but was stopped due to lower extremity edema.\par Neurology every 3 months for dementia\par Ortho every 4months for shots in his wrists\par exercise: trying to get back into it

## 2023-01-05 NOTE — PHYSICAL EXAM
[No Acute Distress] : no acute distress [Well Nourished] : well nourished [Normal Sclera/Conjunctiva] : normal sclera/conjunctiva [No Respiratory Distress] : no respiratory distress  [No Accessory Muscle Use] : no accessory muscle use [Clear to Auscultation] : lungs were clear to auscultation bilaterally [Normal Rate] : normal rate  [Regular Rhythm] : with a regular rhythm [Normal S1, S2] : normal S1 and S2 [No Edema] : there was no peripheral edema [No Extremity Clubbing/Cyanosis] : no extremity clubbing/cyanosis [Soft] : abdomen soft [Non Tender] : non-tender [Non-distended] : non-distended [Normal Bowel Sounds] : normal bowel sounds [Normal Gait] : normal gait [Normal Affect] : the affect was normal [de-identified] : right ear canal cerumen impaction, Left TM intact [de-identified] : AOx1

## 2023-01-05 NOTE — HEALTH RISK ASSESSMENT
[Retired] : retired [] :  [# Of Children ___] : has [unfilled] children [Former] : Former [No falls in past year] : Patient reported no falls in the past year [0] : 2) Feeling down, depressed, or hopeless: Not at all (0) [PHQ-2 Negative - No further assessment needed] : PHQ-2 Negative - No further assessment needed [de-identified] : occasional [[id number]] : 0 [With Significant Other] : lives with significant other [Fully functional (bathing, dressing, toileting, transferring, walking, feeding)] : Fully functional (bathing, dressing, toileting, transferring, walking, feeding) [Reports changes in hearing] : Reports no changes in hearing [Reports changes in vision] : Reports no changes in vision [Reports changes in dental health] : Reports no changes in dental health [de-identified] : RAYSA [de-identified] : needs assistance

## 2023-01-05 NOTE — PLAN
[FreeTextEntry1] : Depression screening negative \par Blood pressures okay off medications.\par Routine follow-up with his neurologist\par Follow-up for physical with fasting labs\par

## 2023-01-23 ENCOUNTER — APPOINTMENT (OUTPATIENT)
Dept: ORTHOPEDIC SURGERY | Facility: CLINIC | Age: 83
End: 2023-01-23

## 2023-01-23 ENCOUNTER — APPOINTMENT (OUTPATIENT)
Dept: ORTHOPEDIC SURGERY | Facility: CLINIC | Age: 83
End: 2023-01-23
Payer: MEDICARE

## 2023-01-23 VITALS — BODY MASS INDEX: 23.62 KG/M2 | WEIGHT: 165 LBS | HEIGHT: 70 IN

## 2023-01-23 DIAGNOSIS — M77.8 OTHER ENTHESOPATHIES, NOT ELSEWHERE CLASSIFIED: ICD-10-CM

## 2023-01-23 PROCEDURE — 99213 OFFICE O/P EST LOW 20 MIN: CPT

## 2023-01-23 NOTE — DISCUSSION/SUMMARY
[de-identified] : Discussed surgical intervention which is complicated by the his previous surgery.\par Recommended patient to get a second opinion with Dr. Cleveland Head. \par PRN.

## 2023-01-23 NOTE — HISTORY OF PRESENT ILLNESS
[Retired] : Work status: retired [9] : 9 [Sharp] : sharp [Injection therapy] : injection therapy [de-identified] : 89 year old male followed for right wrist ECU tendinitis. He is 6 weeks s/p his 4th ECU CSI which helped up until 2 weeks ago. Discussed surgery at the last visit and that it kira not be straight forward procedure as he has a joint replacement.  [] : no [FreeTextEntry1] : right wrist [de-identified] : movements  [de-identified] : Last CSI done 12/12/22, reports relief of sxs only lasted about 12 month.

## 2023-01-26 ENCOUNTER — APPOINTMENT (OUTPATIENT)
Dept: ORTHOPEDIC SURGERY | Facility: CLINIC | Age: 83
End: 2023-01-26

## 2023-03-31 ENCOUNTER — APPOINTMENT (OUTPATIENT)
Dept: INTERNAL MEDICINE | Facility: CLINIC | Age: 83
End: 2023-03-31
Payer: MEDICARE

## 2023-03-31 VITALS
DIASTOLIC BLOOD PRESSURE: 80 MMHG | SYSTOLIC BLOOD PRESSURE: 138 MMHG | BODY MASS INDEX: 24.34 KG/M2 | OXYGEN SATURATION: 98 % | HEIGHT: 70 IN | WEIGHT: 170 LBS | HEART RATE: 72 BPM | TEMPERATURE: 98.4 F

## 2023-03-31 DIAGNOSIS — Z87.898 PERSONAL HISTORY OF OTHER SPECIFIED CONDITIONS: ICD-10-CM

## 2023-03-31 DIAGNOSIS — Z01.818 ENCOUNTER FOR OTHER PREPROCEDURAL EXAMINATION: ICD-10-CM

## 2023-03-31 PROCEDURE — 99214 OFFICE O/P EST MOD 30 MIN: CPT

## 2023-03-31 RX ORDER — IBUPROFEN 200 MG/1
200 CAPSULE, LIQUID FILLED ORAL
Qty: 30 | Refills: 0 | Status: COMPLETED | COMMUNITY
Start: 2022-05-12 | End: 2023-03-31

## 2023-03-31 RX ORDER — DIVALPROEX SODIUM 250 MG/1
250 TABLET, DELAYED RELEASE ORAL
Refills: 0 | Status: COMPLETED | COMMUNITY
End: 2023-03-31

## 2023-03-31 RX ORDER — IBUPROFEN 800 MG/1
800 TABLET, FILM COATED ORAL TWICE DAILY
Qty: 20 | Refills: 0 | Status: COMPLETED | COMMUNITY
Start: 2023-01-23 | End: 2023-03-31

## 2023-03-31 RX ORDER — ATORVASTATIN CALCIUM 20 MG/1
20 TABLET, FILM COATED ORAL
Qty: 90 | Refills: 0 | Status: ACTIVE | COMMUNITY
Start: 1900-01-01 | End: 1900-01-01

## 2023-03-31 RX ORDER — IBUPROFEN 800 MG/1
800 TABLET, FILM COATED ORAL DAILY
Qty: 30 | Refills: 0 | Status: COMPLETED | COMMUNITY
Start: 2022-08-25 | End: 2023-03-31

## 2023-03-31 RX ORDER — DARIFENACIN HYDROBROMIDE 15 MG/1
15 TABLET, EXTENDED RELEASE ORAL
Qty: 90 | Refills: 0 | Status: ACTIVE | COMMUNITY
Start: 2022-05-02

## 2023-03-31 NOTE — HISTORY OF PRESENT ILLNESS
[No Pertinent Cardiac History] : no history of aortic stenosis, atrial fibrillation, coronary artery disease, recent myocardial infarction, or implantable device/pacemaker [No Pertinent Pulmonary History] : no history of asthma, COPD, sleep apnea, or smoking [No Adverse Anesthesia Reaction] : no adverse anesthesia reaction in self or family member [(Patient denies any chest pain, claudication, dyspnea on exertion, orthopnea, palpitations or syncope)] : Patient denies any chest pain, claudication, dyspnea on exertion, orthopnea, palpitations or syncope [Chronic Anticoagulation] : no chronic anticoagulation [Chronic Kidney Disease] : no chronic kidney disease [Diabetes] : no diabetes [FreeTextEntry1] : Right wrist removal of hardware, right distal ulna resection with extensor carpi ulnaris tendon transfer [FreeTextEntry2] : 04/06/2023 [FreeTextEntry3] : Dr. Herr [FreeTextEntry4] : 81 y/o male presents to the office today for a medical clearance\par Type of Surgery: Right wrist removal of hardware, right distal ulna resection with extensor carpi ulnaris tendon transfer\par Name of Surgeon: Dr. Herr\par Location: [facility] \par DOS: 4/06/2023

## 2023-03-31 NOTE — PHYSICAL EXAM
[Normal] : affect was normal and insight and judgment were intact [de-identified] : Slow gait.  Not using his walker ( recommend pt to use his walker to prevent falls)

## 2023-03-31 NOTE — REVIEW OF SYSTEMS
[Unsteady Walk] : ataxia [Negative] : Heme/Lymph [Memory Loss] : memory loss [de-identified] : Numbness and tingling legs from Neuropathy

## 2023-03-31 NOTE — ASSESSMENT
[Patient Optimized for Surgery] : Patient optimized for surgery [As per surgery] : as per surgery [FreeTextEntry4] : 83 yo male presenting for medical clearance for right wrist surgery

## 2023-03-31 NOTE — PLAN
[FreeTextEntry1] : Pt is medically cleared for right wrist surgery \par Pt is able to take his medication the morning of surgery with small sips of water unless otherwise stated by surgeon.

## 2023-04-19 NOTE — ED ADULT TRIAGE NOTE - AS TEMP SITE
Detail Level: Zone Discontinue Regimen: Clobetasol cream Render In Strict Bullet Format?: No Initiate Treatment: Clobetasol ointment apply to left lower leg twice a day for 2 weeks oral

## 2023-04-28 ENCOUNTER — EMERGENCY (EMERGENCY)
Facility: HOSPITAL | Age: 83
LOS: 1 days | Discharge: HOME | End: 2023-04-28
Attending: STUDENT IN AN ORGANIZED HEALTH CARE EDUCATION/TRAINING PROGRAM
Payer: MEDICARE

## 2023-04-28 VITALS
RESPIRATION RATE: 18 BRPM | DIASTOLIC BLOOD PRESSURE: 97 MMHG | HEART RATE: 78 BPM | TEMPERATURE: 98 F | OXYGEN SATURATION: 98 % | SYSTOLIC BLOOD PRESSURE: 166 MMHG

## 2023-04-28 PROCEDURE — 72125 CT NECK SPINE W/O DYE: CPT | Mod: 26,MA

## 2023-04-28 PROCEDURE — 93010 ELECTROCARDIOGRAM REPORT: CPT

## 2023-04-28 PROCEDURE — 93005 ELECTROCARDIOGRAM TRACING: CPT

## 2023-04-28 PROCEDURE — 99284 EMERGENCY DEPT VISIT MOD MDM: CPT | Mod: 25

## 2023-04-28 PROCEDURE — 99285 EMERGENCY DEPT VISIT HI MDM: CPT

## 2023-04-28 PROCEDURE — 72125 CT NECK SPINE W/O DYE: CPT | Mod: MA

## 2023-04-28 PROCEDURE — 70450 CT HEAD/BRAIN W/O DYE: CPT | Mod: 26,MA

## 2023-04-28 PROCEDURE — 70450 CT HEAD/BRAIN W/O DYE: CPT | Mod: MA

## 2023-04-28 PROCEDURE — 96372 THER/PROPH/DIAG INJ SC/IM: CPT

## 2023-04-28 RX ORDER — OLANZAPINE 15 MG/1
5 TABLET, FILM COATED ORAL ONCE
Refills: 0 | Status: DISCONTINUED | OUTPATIENT
Start: 2023-04-28 | End: 2023-05-06

## 2023-04-28 RX ORDER — HALOPERIDOL DECANOATE 100 MG/ML
2.5 INJECTION INTRAMUSCULAR ONCE
Refills: 0 | Status: COMPLETED | OUTPATIENT
Start: 2023-04-28 | End: 2023-04-28

## 2023-04-28 RX ADMIN — HALOPERIDOL DECANOATE 2.5 MILLIGRAM(S): 100 INJECTION INTRAMUSCULAR at 23:28

## 2023-04-28 NOTE — ED ADULT TRIAGE NOTE - CHIEF COMPLAINT QUOTE
BIBEMS from home s/p unwitnessed fall in the bathroom. Pt with PMHx Alzheimer's was found on the floor in the bathroom by his wife. Pt has no medical complaints at this time and denies any pain. Pt is AAOx1 which is his baseline as per EMS. EMS states that he is not on any anti-coagulation medications. BIBEMS from home s/p unwitnessed fall in the bathroom. Pt with PMHx Alzheimer's was found on the floor in the bathroom by his wife. Pt has no medical complaints at this time and denies any pain, no obvious signs of trauma. Pt is AAOx1 which is his baseline as per EMS. EMS states that he is not on any anti-coagulation medications.

## 2023-04-29 VITALS
SYSTOLIC BLOOD PRESSURE: 156 MMHG | RESPIRATION RATE: 20 BRPM | HEART RATE: 83 BPM | DIASTOLIC BLOOD PRESSURE: 92 MMHG | TEMPERATURE: 98 F | OXYGEN SATURATION: 99 %

## 2023-04-29 NOTE — ED ADULT NURSE NOTE - OBJECTIVE STATEMENT
AxOx1, patient confused and unable to recall events that led him at hospital. Patient comes from home s/p unwitnessed fall in the bathroom. Pt with PMHx Alzheimer's was found on the floor in the bathroom by his wife. Pt has no medical complaints at this time and denies any pain, no obvious signs of trauma. Pt is AAOx1 which is his baseline as per EMS. EMS states that he is not on any anti-coagulation medications. MD at bedside.

## 2023-04-29 NOTE — ED PROVIDER NOTE - PHYSICAL EXAMINATION
General: NAD, well appearing  HEENT: Normocephalic, atraumatic, PERRLA, EOMI  Neck: No apparent stiffness or JVD  Pulm: Chest wall symmetric and nontender, lungs clear to ascultation   Cardiac: Regular rate and regular rhythm, distal pulses intact  Abdomen: Nontender and nondistended  Skin: Skin is warm, dry and intact without rashes or lesions.  Neuro: No motor or sensory deficits above reported baseline, ambulatory  MSK: No deformity or tenderness

## 2023-04-29 NOTE — ED ADULT NURSE NOTE - NSIMPLEMENTINTERV_GEN_ALL_ED
Implemented All Fall Risk Interventions:  [place] to call system. Call bell, personal items and telephone within reach. Instruct patient to call for assistance. Room bathroom lighting operational. Non-slip footwear when patient is off stretcher. Physically safe environment: no spills, clutter or unnecessary equipment. Stretcher in lowest position, wheels locked, appropriate side rails in place. Provide visual cue, wrist band, yellow gown, etc. Monitor gait and stability. Monitor for mental status changes and reorient to person, place, and time. Review medications for side effects contributing to fall risk. Reinforce activity limits and safety measures with patient and family.

## 2023-04-29 NOTE — ED PROVIDER NOTE - OBJECTIVE STATEMENT
83 y/o male hx dementia, htn, prostate cancer,  shunt for hydrocephalus due to mass sent from home for fall.  Wife states patient falls frequently for years due to balance and peripheral neuropathy issues but family does not want him in nursing home.   Wife was alone at the time and could not get a hold of anyone to help him up [usually neighbor or family member is available] and called 911.  Wife wants him to go home and so does the patient.  Patient denies any injuries, states he just slipped tin the bathroom and remembers event, no dizziness or LOC.

## 2023-04-29 NOTE — ED PROVIDER NOTE - NSFOLLOWUPINSTRUCTIONS_ED_ALL_ED_FT
Fall    It is common to have injuries to your face, neck, arms, and body after a fall. These injuries may include cuts, burns, bruises, and sore muscles. These injuries tend to feel worse for the first 24–48 hours but will start to feel better after that. Over the counter pain medications are effective in controlling pain.    SEEK IMMEDIATE MEDICAL CARE IF YOU HAVE ANY OF THE FOLLOWING SYMPTOMS: numbness, tingling, or weakness in your arms or legs, severe neck pain, changes in bowel or bladder control, shortness of breath, chest pain, blood in your urine/stool/vomit, headache, visual changes, lightheadedness/dizziness, or fainting.

## 2023-04-29 NOTE — ED ADULT NURSE NOTE - CHIEF COMPLAINT QUOTE
BIBEMS from home s/p unwitnessed fall in the bathroom. Pt with PMHx Alzheimer's was found on the floor in the bathroom by his wife. Pt has no medical complaints at this time and denies any pain, no obvious signs of trauma. Pt is AAOx1 which is his baseline as per EMS. EMS states that he is not on any anti-coagulation medications.

## 2023-04-29 NOTE — ED PROVIDER NOTE - ATTENDING CONTRIBUTION TO CARE
82y M w/ hx dementia, HTN, prostate CA, hydrocephalus s/p  shunt; presents from home after unwitnessed fall in the bathroom. Pt AAOx1, reportedly at baseline per EMS. Pt offers no complaints. Unable to provide further useful hx due to dementia. On exam, pt in no distress. No external signs of trauma. Heart RRR, lungs CTAB, no focal  neuro deficits. CT head/cspine without acute findings. While in the ED, pt uncooperative and trying to get out of bed despite attempts at verbal redirection. Given haldol and then olanzapine with improvement. 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 We offered PT for possible MENDEZ, but she prefers for him to be discharged home. Ambulance arranged.

## 2023-04-29 NOTE — ED PROVIDER NOTE - CLINICAL SUMMARY MEDICAL DECISION MAKING FREE TEXT BOX
Patient well appearing on exam however concern for frequent falls.  Family and patient are well aware of the risks and wish to care for him at home with precautions.  Family has capacity.  CT head shows interval degeneration, patient to follow up with PCM  Patient/family was given full return precautions, counseled on red flag symptoms such as LOC, fever, severe pain, or focal deficits and advised to return to the ED for these reasons or any reason that was concerning to them. Patient/family was informed of all significant and incidental findings found on this workup today and all results were reviewed.   All questions were answered, advised to make close follow up with their primary care provider and specialty clinics (as applicable) to follow up with this visit and continue investigation/treatment.   Patient/family has shown adequate understanding and is agreeable to the plan.

## 2023-04-29 NOTE — ED PROVIDER NOTE - PATIENT PORTAL LINK FT
You can access the FollowMyHealth Patient Portal offered by  by registering at the following website: [email or web address]. By joining [employer]’s FollowMyHealth portal, you will also be able to view your health information using other applications (apps) compatible with our system.

## 2023-05-15 NOTE — ED PROVIDER NOTE - HIV OFFER
"This note is intended for the purposes of medical student education and feedback only.   Please refer to the documentation by this patient's assigned medical practitioner for details of care and plans.    Medical student: Erica Presley, MS3  Attending: Margarita Bazzi MD     ID: Haja Hernandez is a presents with weakness and poor oral intake. He was admitted for IMELDA and hyponatremia.     Hospital day: 2     INTERVAL EVENTS: hypotensive reading at 0740 hours this morning of BP 90/60. Pt denies lightheadedness or dizziness. He received amlodipine 10 mg at 0500 this morning.     SUBJECTIVE  Pt reports he feels sleepy. He denies lightheadedness, weakness, dizziness. He reports weakness and problems with balance at his home prior to admission. He reports he is constipated and doesn't remember when his last BM was.  Pt reports good appetite. He denies palpitations.     Review of Systems  Denies fever, chills, nausea, chest pain, palpitations, dyspnea.     OBJECTIVE     Vital Signs:  BP (!) 119/97   Pulse 89   Temp 36.3 °C (97.4 °F) (Temporal)   Resp 15   Ht 1.753 m (5' 9\")   Wt 68.9 kg (152 lb)   SpO2 90%    Temp:  [36.1 °C (97 °F)-36.6 °C (97.9 °F)] 36.3 °C (97.4 °F)  Pulse:  [66-91] 89  Resp:  [14-18] 15  BP: ()/(51-97) 119/97  SpO2:  [90 %-96 %] 90 % on room air       Physical Exam  General: Appears to be in no acute distress laying in bed.   HEENT: Normocephalic, atraumatic. EOMI, PERRLA. Mucous membranes moist. Cardio: Irregularly irregular rhythm. Regular rate. Normal S1 and S2. No murmurs, rubs, or gallops.  Pulmonary: Lungs are clear to auscultation bilaterally. No wheezes, rales, or rhonchi. Normal respiratory effort.   Abdomen: Normoactive bowel sounds. Abdomen is soft and nondistended. No masses. No tenderness to palpation in all four " Previously Declined (within the last year)

## 2023-07-20 NOTE — HEALTH RISK ASSESSMENT
[No falls in past year] : Patient reported no falls in the past year [0] : 2) Feeling down, depressed, or hopeless: Not at all (0) [PHQ-2 Negative - No further assessment needed] : PHQ-2 Negative - No further assessment needed [de-identified] : occasional [[id number]] : 0 [With Significant Other] : lives with significant other [Retired] : retired [] :  [# Of Children ___] : has [unfilled] children [Fully functional (bathing, dressing, toileting, transferring, walking, feeding)] : Fully functional (bathing, dressing, toileting, transferring, walking, feeding) [Reports changes in hearing] : Reports no changes in hearing [Reports changes in vision] : Reports no changes in vision [Reports changes in dental health] : Reports no changes in dental health [de-identified] : RAYSA [de-identified] : needs assistance

## 2023-07-20 NOTE — REVIEW OF SYSTEMS
[Lower Ext Edema] : no lower extremity edema [Paroxysmal Nocturnal Dyspnea] : no paroxysmal nocturnal dyspnea [Negative] : Heme/Lymph

## 2023-07-20 NOTE — HISTORY OF PRESENT ILLNESS
[Other: _____] : [unfilled] [FreeTextEntry1] : NOT A PHYSICAL [de-identified] : 82-year-old male with a history of dementia, hypertension and urinary incontinence who is here for an initial visit.  He is here with his wife.  All the history is from her.\par They moved back to [place] from the [facility] about 1 year ago.  They are reestablishing care here\par He was on amlodipine for his blood pressure but was stopped due to lower extremity edema.\par Neurology every 3 months for dementia\par Ortho every 4months for shots in his wrists\par exercise: trying to get back into it

## 2023-07-20 NOTE — PHYSICAL EXAM
[No Acute Distress] : no acute distress [Well Nourished] : well nourished [Normal Sclera/Conjunctiva] : normal sclera/conjunctiva [No Respiratory Distress] : no respiratory distress  [No Accessory Muscle Use] : no accessory muscle use [Clear to Auscultation] : lungs were clear to auscultation bilaterally [Normal Rate] : normal rate  [Regular Rhythm] : with a regular rhythm [Normal S1, S2] : normal S1 and S2 [No Edema] : there was no peripheral edema [No Extremity Clubbing/Cyanosis] : no extremity clubbing/cyanosis [Soft] : abdomen soft [Non Tender] : non-tender [Non-distended] : non-distended [Normal Bowel Sounds] : normal bowel sounds [Normal Gait] : normal gait [Normal Affect] : the affect was normal [de-identified] : right ear canal cerumen impaction, Left TM intact [de-identified] : AOx1

## 2023-07-21 ENCOUNTER — APPOINTMENT (OUTPATIENT)
Dept: INTERNAL MEDICINE | Facility: CLINIC | Age: 83
End: 2023-07-21

## 2024-05-21 NOTE — PHYSICAL THERAPY INITIAL EVALUATION ADULT - PATIENT/FAMILY/SIGNIFICANT OTHER GOALS STATEMENT, PT EVAL
May 21, 2024      To Whom It May Concern:      Antonia Marc was seen in our Emergency Department today, 05/21/24. Due to her symptoms, please excuse her absence from work on dates 5/20/2024-5/21/2024. She may return to work/school when improved.    Sincerely,    Darlene Powell RN         "to figure out why I keep falling"

## 2024-07-18 ENCOUNTER — APPOINTMENT (OUTPATIENT)
Dept: INTERNAL MEDICINE | Facility: CLINIC | Age: 84
End: 2024-07-18
Payer: MEDICARE

## 2024-07-18 VITALS
OXYGEN SATURATION: 97 % | DIASTOLIC BLOOD PRESSURE: 62 MMHG | HEIGHT: 70 IN | SYSTOLIC BLOOD PRESSURE: 112 MMHG | RESPIRATION RATE: 12 BRPM | HEART RATE: 70 BPM | TEMPERATURE: 98 F | WEIGHT: 179 LBS | BODY MASS INDEX: 25.62 KG/M2

## 2024-07-18 VITALS — DIASTOLIC BLOOD PRESSURE: 60 MMHG | SYSTOLIC BLOOD PRESSURE: 114 MMHG

## 2024-07-18 DIAGNOSIS — D50.9 IRON DEFICIENCY ANEMIA, UNSPECIFIED: ICD-10-CM

## 2024-07-18 DIAGNOSIS — Z86.73 PERSONAL HISTORY OF TRANSIENT ISCHEMIC ATTACK (TIA), AND CEREBRAL INFARCTION W/OUT RESIDUAL DEFICITS: ICD-10-CM

## 2024-07-18 DIAGNOSIS — N40.1 BENIGN PROSTATIC HYPERPLASIA WITH LOWER URINARY TRACT SYMPMS: ICD-10-CM

## 2024-07-18 DIAGNOSIS — N13.8 BENIGN PROSTATIC HYPERPLASIA WITH LOWER URINARY TRACT SYMPMS: ICD-10-CM

## 2024-07-18 DIAGNOSIS — Z86.69 PERSONAL HISTORY OF OTHER DISEASES OF THE NERVOUS SYSTEM AND SENSE ORGANS: ICD-10-CM

## 2024-07-18 DIAGNOSIS — G47.00 INSOMNIA, UNSPECIFIED: ICD-10-CM

## 2024-07-18 DIAGNOSIS — I10 ESSENTIAL (PRIMARY) HYPERTENSION: ICD-10-CM

## 2024-07-18 DIAGNOSIS — E78.5 HYPERLIPIDEMIA, UNSPECIFIED: ICD-10-CM

## 2024-07-18 DIAGNOSIS — F03.90 UNSPECIFIED DEMENTIA W/OUT BEHAVIORAL DISTURBANCE: ICD-10-CM

## 2024-07-18 DIAGNOSIS — G62.9 POLYNEUROPATHY, UNSPECIFIED: ICD-10-CM

## 2024-07-18 DIAGNOSIS — F32.A DEPRESSION, UNSPECIFIED: ICD-10-CM

## 2024-07-18 PROCEDURE — 36415 COLL VENOUS BLD VENIPUNCTURE: CPT

## 2024-07-18 PROCEDURE — G2211 COMPLEX E/M VISIT ADD ON: CPT

## 2024-07-18 PROCEDURE — 99214 OFFICE O/P EST MOD 30 MIN: CPT

## 2024-07-18 RX ORDER — CHLORHEXIDINE GLUCONATE 4 %
325 (65 FE) LIQUID (ML) TOPICAL
Qty: 90 | Refills: 3 | Status: ACTIVE | COMMUNITY
Start: 2024-07-18 | End: 1900-01-01

## 2024-07-18 RX ORDER — MIRTAZAPINE 45 MG/1
45 TABLET, FILM COATED ORAL
Qty: 90 | Refills: 3 | Status: ACTIVE | COMMUNITY
Start: 1900-01-01 | End: 1900-01-01

## 2024-07-18 RX ORDER — LISINOPRIL 10 MG/1
10 TABLET ORAL
Qty: 90 | Refills: 3 | Status: ACTIVE | COMMUNITY
Start: 1900-01-01 | End: 1900-01-01

## 2024-07-18 RX ORDER — FERROUS SULFATE 325(65) MG
325 TABLET ORAL DAILY
Qty: 90 | Refills: 3 | Status: DISCONTINUED | COMMUNITY
End: 2024-07-18

## 2024-07-18 RX ORDER — TAMSULOSIN HYDROCHLORIDE 0.4 MG/1
0.4 CAPSULE ORAL
Qty: 90 | Refills: 3 | Status: ACTIVE | COMMUNITY

## 2024-07-25 LAB
ALBUMIN SERPL ELPH-MCNC: 4.7 G/DL
ALP BLD-CCNC: 40 U/L
ALT SERPL-CCNC: 29 U/L
ANION GAP SERPL CALC-SCNC: 14 MMOL/L
AST SERPL-CCNC: 18 U/L
BASOPHILS # BLD AUTO: 0.05 K/UL
BASOPHILS NFR BLD AUTO: 1 %
BILIRUB SERPL-MCNC: 0.3 MG/DL
BUN SERPL-MCNC: 23 MG/DL
CALCIUM SERPL-MCNC: 9.5 MG/DL
CHLORIDE SERPL-SCNC: 105 MMOL/L
CHOLEST SERPL-MCNC: 209 MG/DL
CO2 SERPL-SCNC: 23 MMOL/L
CREAT SERPL-MCNC: 1.51 MG/DL
EGFR: 46 ML/MIN/1.73M2
EOSINOPHIL # BLD AUTO: 0.25 K/UL
EOSINOPHIL NFR BLD AUTO: 5 %
ESTIMATED AVERAGE GLUCOSE: 103 MG/DL
FERRITIN SERPL-MCNC: 245 NG/ML
GLUCOSE SERPL-MCNC: 99 MG/DL
HBA1C MFR BLD HPLC: 5.2 %
HCT VFR BLD CALC: 34.6 %
HDLC SERPL-MCNC: 79 MG/DL
HGB BLD-MCNC: 10.9 G/DL
IMM GRANULOCYTES NFR BLD AUTO: 0.2 %
IRON SATN MFR SERPL: 32 %
IRON SERPL-MCNC: 88 UG/DL
LDLC SERPL CALC-MCNC: 98 MG/DL
LYMPHOCYTES # BLD AUTO: 0.96 K/UL
LYMPHOCYTES NFR BLD AUTO: 19.2 %
MAN DIFF?: NORMAL
MCHC RBC-ENTMCNC: 31 PG
MCHC RBC-ENTMCNC: 31.5 GM/DL
MCV RBC AUTO: 98.3 FL
MONOCYTES # BLD AUTO: 0.31 K/UL
MONOCYTES NFR BLD AUTO: 6.2 %
NEUTROPHILS # BLD AUTO: 3.41 K/UL
NEUTROPHILS NFR BLD AUTO: 68.4 %
NONHDLC SERPL-MCNC: 130 MG/DL
PLATELET # BLD AUTO: 256 K/UL
POTASSIUM SERPL-SCNC: 5.3 MMOL/L
PROT SERPL-MCNC: 6.9 G/DL
RBC # BLD: 3.52 M/UL
RBC # FLD: 13.5 %
SODIUM SERPL-SCNC: 142 MMOL/L
TIBC SERPL-MCNC: 278 UG/DL
TRIGL SERPL-MCNC: 190 MG/DL
TSH SERPL-ACNC: 2.11 UIU/ML
UIBC SERPL-MCNC: 190 UG/DL
VIT B12 SERPL-MCNC: 532 PG/ML
WBC # FLD AUTO: 4.99 K/UL

## 2024-08-01 ENCOUNTER — TRANSCRIPTION ENCOUNTER (OUTPATIENT)
Age: 84
End: 2024-08-01

## 2024-08-19 ENCOUNTER — APPOINTMENT (OUTPATIENT)
Dept: ORTHOPEDIC SURGERY | Facility: CLINIC | Age: 84
End: 2024-08-19
Payer: MEDICARE

## 2024-08-19 DIAGNOSIS — M54.16 RADICULOPATHY, LUMBAR REGION: ICD-10-CM

## 2024-08-19 PROCEDURE — 72100 X-RAY EXAM L-S SPINE 2/3 VWS: CPT

## 2024-08-19 PROCEDURE — 99204 OFFICE O/P NEW MOD 45 MIN: CPT

## 2024-08-19 RX ORDER — MELOXICAM 15 MG/1
15 TABLET ORAL
Qty: 30 | Refills: 0 | Status: ACTIVE | COMMUNITY
Start: 2024-08-19 | End: 1900-01-01

## 2024-08-25 NOTE — DATA REVIEWED
[I independently reviewed and interpreted images and report] : I independently reviewed and interpreted images and report [FreeTextEntry1] : I stop paperwork reviewed MED progress notes reviewed Ortho progress notes reviewed

## 2024-08-25 NOTE — DISCUSSION/SUMMARY
[de-identified] : 84 Y M with lumbar radiculopathy and facet disease XR today reveals facet arthritis L4/5 L5S1 Patient was provided with a referral for lumbar physical therapy to work on stretching, strengthening and range of motion, emphasis quadriceps strengthening I am prescribing the patient Meloxicam (15mg x7-10 days then 7.5mg up to BID PRN) for pain relief. Titration Schedule Provided.  F/u 6 weeks  Prior to appointment and during encounter with patient extensive medical records were reviewed including but not limited to, hospital records, outpatient records, imaging results, and lab data. During this appointment the patient was examined, diagnoses were discussed and explained in a face to face manner. In addition extensive time was spent reviewing aforementioned diagnostic studies. Counseling including abnormal image results, differential diagnoses, treatment options, risk and benefits, lifestyle changes, current condition, and current medications was performed. Patient's comments, questions, and concerns were addressed and patient verbalized understanding. Based on this patient's presentation at our office, which is an orthopedic spine surgeon's office, this patient inherently / intrinsically has a risk, however minute, of developing issues such as Cauda equina syndrome, bowel and bladder changes, or progression of motor or neurological deficits such as paralysis which may be permanent.   I, [EVER Cain], certify that the clinical information was reviewed with Dr. Milian, who was physically present in the office. He agreed with the above plan of care and was available for consultation as necessary during the office visit.

## 2024-08-25 NOTE — HISTORY OF PRESENT ILLNESS
[Lower back] : lower back [Gradual] : gradual [5] : 5 [Dull/Aching] : dull/aching [Nothing helps with pain getting better] : Nothing helps with pain getting better [Sitting] : sitting [de-identified] : 08/19/2024 - 84-year-old male presents for an initial evaluation, accompanied by his wife. He reports new-onset left-sided back pain that has been present for a few months. He also experiences pain and numbness in his left leg, with the numbness being a longstanding issue. The pain is exacerbated by sitting, extending, standing, or walking. He describes unsteadiness on his feet but cannot specify if he feels weak. The pain has been progressively worsening. He has not received any treatments for these symptoms. His past medical history includes idiopathic neuropathy, but he notes that his current symptoms feel different from his baseline neuropathy. [] : no [FreeTextEntry3] : 02/2024 [FreeTextEntry7] : Left leg

## 2024-08-25 NOTE — PHYSICAL EXAM
[Normal Coordination] : normal coordination [Normal DTR UE/LE] : normal DTR UE/LE  [Normal Sensation] : normal sensation [Normal Mood and Affect] : normal mood and affect [Oriented] : oriented [Able to Communicate] : able to communicate [Normal Skin] : normal skin [No Rash] : no rash [No Ulcers] : no ulcers [No Lesions] : no lesions [No obvious lymphadenopathy in areas examined] : no obvious lymphadenopathy in areas examined [Well Developed] : well developed [Peripheral vascular exam is grossly normal] : peripheral vascular exam is grossly normal [No Respiratory Distress] : no respiratory distress [Lungs clear to auscultation bilaterally] : lungs clear to auscultation bilaterally [Normal Bowel Sounds] : normal bowel sounds [Non-Tender] : non-tender [No HSM] : no HSM [No Mass] : no mass [3___] : left quadriceps 3[unfilled]/5 [5___] : right extensor hallicus longus 5[unfilled]/5 [] : light touch is not intact throughout both lower extremities

## 2024-08-25 NOTE — HISTORY OF PRESENT ILLNESS
[Lower back] : lower back [Gradual] : gradual [5] : 5 [Dull/Aching] : dull/aching [Nothing helps with pain getting better] : Nothing helps with pain getting better [Sitting] : sitting [de-identified] : 08/19/2024 - 84-year-old male presents for an initial evaluation, accompanied by his wife. He reports new-onset left-sided back pain that has been present for a few months. He also experiences pain and numbness in his left leg, with the numbness being a longstanding issue. The pain is exacerbated by sitting, extending, standing, or walking. He describes unsteadiness on his feet but cannot specify if he feels weak. The pain has been progressively worsening. He has not received any treatments for these symptoms. His past medical history includes idiopathic neuropathy, but he notes that his current symptoms feel different from his baseline neuropathy. [] : no [FreeTextEntry3] : 02/2024 [FreeTextEntry7] : Left leg

## 2024-08-25 NOTE — DISCUSSION/SUMMARY
[de-identified] : 84 Y M with lumbar radiculopathy and facet disease XR today reveals facet arthritis L4/5 L5S1 Patient was provided with a referral for lumbar physical therapy to work on stretching, strengthening and range of motion, emphasis quadriceps strengthening I am prescribing the patient Meloxicam (15mg x7-10 days then 7.5mg up to BID PRN) for pain relief. Titration Schedule Provided.  F/u 6 weeks  Prior to appointment and during encounter with patient extensive medical records were reviewed including but not limited to, hospital records, outpatient records, imaging results, and lab data. During this appointment the patient was examined, diagnoses were discussed and explained in a face to face manner. In addition extensive time was spent reviewing aforementioned diagnostic studies. Counseling including abnormal image results, differential diagnoses, treatment options, risk and benefits, lifestyle changes, current condition, and current medications was performed. Patient's comments, questions, and concerns were addressed and patient verbalized understanding. Based on this patient's presentation at our office, which is an orthopedic spine surgeon's office, this patient inherently / intrinsically has a risk, however minute, of developing issues such as Cauda equina syndrome, bowel and bladder changes, or progression of motor or neurological deficits such as paralysis which may be permanent.   I, [EVER Cain], certify that the clinical information was reviewed with Dr. Milian, who was physically present in the office. He agreed with the above plan of care and was available for consultation as necessary during the office visit.

## 2024-09-01 NOTE — REVIEW OF SYSTEMS
[Lower Ext Edema] : no lower extremity edema [Paroxysmal Nocturnal Dyspnea] : no paroxysmal nocturnal dyspnea [Headache] : no headache [Unsteady Walk] : no ataxia [Memory Loss] : memory loss [Negative] : Heme/Lymph [de-identified] : LE neuropathy

## 2024-09-01 NOTE — PHYSICAL EXAM
[No Acute Distress] : no acute distress [Well Nourished] : well nourished [No Respiratory Distress] : no respiratory distress  [No Accessory Muscle Use] : no accessory muscle use [Clear to Auscultation] : lungs were clear to auscultation bilaterally [Normal Rate] : normal rate  [Regular Rhythm] : with a regular rhythm [Normal S1, S2] : normal S1 and S2 [No Edema] : there was no peripheral edema [No Extremity Clubbing/Cyanosis] : no extremity clubbing/cyanosis [Soft] : abdomen soft [Non-distended] : non-distended [Non Tender] : non-tender [Normal Bowel Sounds] : normal bowel sounds [No Rash] : no rash [Normal Gait] : normal gait [Normal Affect] : the affect was normal [de-identified] : AOx1 [de-identified] : friendly, awake/alert

## 2024-09-03 ENCOUNTER — APPOINTMENT (OUTPATIENT)
Dept: INTERNAL MEDICINE | Facility: CLINIC | Age: 84
End: 2024-09-03

## 2024-09-03 DIAGNOSIS — Z01.818 ENCOUNTER FOR OTHER PREPROCEDURAL EXAMINATION: ICD-10-CM

## 2024-09-19 ENCOUNTER — APPOINTMENT (OUTPATIENT)
Dept: INTERNAL MEDICINE | Facility: CLINIC | Age: 84
End: 2024-09-19
Payer: MEDICARE

## 2024-09-19 VITALS
DIASTOLIC BLOOD PRESSURE: 76 MMHG | WEIGHT: 188 LBS | RESPIRATION RATE: 12 BRPM | HEART RATE: 76 BPM | SYSTOLIC BLOOD PRESSURE: 114 MMHG | BODY MASS INDEX: 26.92 KG/M2 | TEMPERATURE: 97.9 F | OXYGEN SATURATION: 99 % | HEIGHT: 70 IN

## 2024-09-19 DIAGNOSIS — E78.5 HYPERLIPIDEMIA, UNSPECIFIED: ICD-10-CM

## 2024-09-19 DIAGNOSIS — R05.9 COUGH, UNSPECIFIED: ICD-10-CM

## 2024-09-19 DIAGNOSIS — K21.9 GASTRO-ESOPHAGEAL REFLUX DISEASE W/OUT ESOPHAGITIS: ICD-10-CM

## 2024-09-19 DIAGNOSIS — F03.90 UNSPECIFIED DEMENTIA W/OUT BEHAVIORAL DISTURBANCE: ICD-10-CM

## 2024-09-19 DIAGNOSIS — I10 ESSENTIAL (PRIMARY) HYPERTENSION: ICD-10-CM

## 2024-09-19 DIAGNOSIS — Z23 ENCOUNTER FOR IMMUNIZATION: ICD-10-CM

## 2024-09-19 PROCEDURE — 99214 OFFICE O/P EST MOD 30 MIN: CPT | Mod: 25

## 2024-09-19 PROCEDURE — 90662 IIV NO PRSV INCREASED AG IM: CPT

## 2024-09-19 PROCEDURE — G0008: CPT

## 2024-09-19 RX ORDER — BENZONATATE 200 MG/1
200 CAPSULE ORAL
Qty: 45 | Refills: 1 | Status: ACTIVE | COMMUNITY
Start: 2024-09-19 | End: 1900-01-01

## 2024-09-19 RX ORDER — GABAPENTIN 100 MG/1
CAPSULE ORAL
Refills: 0 | Status: ACTIVE | COMMUNITY

## 2024-09-19 NOTE — PLAN
[FreeTextEntry1] : Prolonged cough - now resolved. Renewed Tessalon PRN. GERD - followed with GI. Scheduled soon for endoscopy. HTN - BP controlled. Continue Lisinopril. HLD - check lipids / chemistries, continue Lipitor. Anemia - check CBC and iron studies. Continue iron supplement. Dementia - on Aricept and Memantine. Followed with neurologist.  RTO as scheduled next month.

## 2024-09-19 NOTE — REVIEW OF SYSTEMS
[Shortness Of Breath] : no shortness of breath [Wheezing] : no wheezing [Cough] : no cough [Negative] : Cardiovascular

## 2024-09-19 NOTE — HISTORY OF PRESENT ILLNESS
[Spouse] : spouse [FreeTextEntry8] : Patient comes for an acute visit.   He is here for evaluation of cough x few months. Cough productive of white sputum.  He went to  few weeks ago. Tested negative for COVID and Flu. CXR was negative. He was treated with Zpak and Tessalon. His wife reports the cough resolved 2 days ago. No fever or chills. Pt feels well, no complaints. He is followed with GI for GERD. He is scheduled soon for an endoscopy.

## 2024-09-30 ENCOUNTER — APPOINTMENT (OUTPATIENT)
Dept: ORTHOPEDIC SURGERY | Facility: CLINIC | Age: 84
End: 2024-09-30

## 2024-10-21 ENCOUNTER — APPOINTMENT (OUTPATIENT)
Dept: INTERNAL MEDICINE | Facility: CLINIC | Age: 84
End: 2024-10-21

## 2024-10-21 DIAGNOSIS — M54.16 RADICULOPATHY, LUMBAR REGION: ICD-10-CM

## 2024-10-21 DIAGNOSIS — E78.5 HYPERLIPIDEMIA, UNSPECIFIED: ICD-10-CM

## 2024-10-21 DIAGNOSIS — F03.90 UNSPECIFIED DEMENTIA W/OUT BEHAVIORAL DISTURBANCE: ICD-10-CM

## 2024-10-21 DIAGNOSIS — I10 ESSENTIAL (PRIMARY) HYPERTENSION: ICD-10-CM

## 2024-10-21 DIAGNOSIS — G62.9 POLYNEUROPATHY, UNSPECIFIED: ICD-10-CM

## 2024-11-13 ENCOUNTER — RX RENEWAL (OUTPATIENT)
Age: 84
End: 2024-11-13

## 2024-12-02 ENCOUNTER — APPOINTMENT (OUTPATIENT)
Dept: INTERNAL MEDICINE | Facility: CLINIC | Age: 84
End: 2024-12-02
Payer: MEDICARE

## 2024-12-02 VITALS
HEART RATE: 79 BPM | BODY MASS INDEX: 27.2 KG/M2 | HEIGHT: 70 IN | SYSTOLIC BLOOD PRESSURE: 140 MMHG | TEMPERATURE: 97.6 F | WEIGHT: 190 LBS | OXYGEN SATURATION: 97 % | DIASTOLIC BLOOD PRESSURE: 70 MMHG

## 2024-12-02 DIAGNOSIS — J06.9 ACUTE UPPER RESPIRATORY INFECTION, UNSPECIFIED: ICD-10-CM

## 2024-12-02 PROCEDURE — 99214 OFFICE O/P EST MOD 30 MIN: CPT

## 2024-12-02 RX ORDER — PROMETHAZINE HYDROCHLORIDE AND DEXTROMETHORPHAN HYDROBROMIDE ORAL SOLUTION 15; 6.25 MG/5ML; MG/5ML
6.25-15 SOLUTION ORAL
Qty: 200 | Refills: 0 | Status: ACTIVE | COMMUNITY
Start: 2024-12-02 | End: 1900-01-01

## 2024-12-02 RX ORDER — PREDNISONE 20 MG/1
20 TABLET ORAL
Qty: 10 | Refills: 0 | Status: ACTIVE | COMMUNITY
Start: 2024-12-02 | End: 1900-01-01

## 2024-12-04 ENCOUNTER — APPOINTMENT (OUTPATIENT)
Dept: INTERNAL MEDICINE | Facility: CLINIC | Age: 84
End: 2024-12-04

## 2024-12-06 ENCOUNTER — RX RENEWAL (OUTPATIENT)
Age: 84
End: 2024-12-06

## 2024-12-31 ENCOUNTER — APPOINTMENT (OUTPATIENT)
Dept: PULMONOLOGY | Facility: CLINIC | Age: 84
End: 2024-12-31
Payer: MEDICARE

## 2024-12-31 VITALS
OXYGEN SATURATION: 96 % | HEIGHT: 69 IN | BODY MASS INDEX: 27.85 KG/M2 | DIASTOLIC BLOOD PRESSURE: 68 MMHG | SYSTOLIC BLOOD PRESSURE: 136 MMHG | RESPIRATION RATE: 16 BRPM | HEART RATE: 72 BPM | WEIGHT: 188 LBS

## 2024-12-31 PROCEDURE — G2211 COMPLEX E/M VISIT ADD ON: CPT

## 2024-12-31 PROCEDURE — 99205 OFFICE O/P NEW HI 60 MIN: CPT

## 2024-12-31 RX ORDER — UMECLIDINIUM BROMIDE AND VILANTEROL TRIFENATATE 62.5; 25 UG/1; UG/1
62.5-25 POWDER RESPIRATORY (INHALATION)
Qty: 3 | Refills: 3 | Status: ACTIVE | COMMUNITY
Start: 2024-12-31 | End: 1900-01-01

## 2024-12-31 RX ORDER — GLYCOPYRROLATE AND FORMOTEROL FUMARATE 9; 4.8 UG/1; UG/1
9-4.8 AEROSOL, METERED RESPIRATORY (INHALATION) TWICE DAILY
Qty: 1 | Refills: 5 | Status: DISCONTINUED | COMMUNITY
Start: 2024-12-31 | End: 2024-12-31

## 2024-12-31 RX ORDER — PROMETHAZINE HYDROCHLORIDE AND DEXTROMETHORPHAN HYDROBROMIDE ORAL SOLUTION 15; 6.25 MG/5ML; MG/5ML
6.25-15 SOLUTION ORAL EVERY 6 HOURS
Qty: 500 | Refills: 3 | Status: ACTIVE | COMMUNITY
Start: 2024-12-31 | End: 1900-01-01

## 2025-01-08 RX ORDER — GABAPENTIN 300 MG/1
300 TABLET ORAL
Qty: 30 | Refills: 3 | Status: ACTIVE | COMMUNITY
Start: 2025-01-08 | End: 1900-01-01

## 2025-01-23 ENCOUNTER — APPOINTMENT (OUTPATIENT)
Dept: INTERNAL MEDICINE | Facility: CLINIC | Age: 85
End: 2025-01-23
Payer: MEDICARE

## 2025-01-23 VITALS
TEMPERATURE: 98.2 F | DIASTOLIC BLOOD PRESSURE: 76 MMHG | SYSTOLIC BLOOD PRESSURE: 140 MMHG | BODY MASS INDEX: 28.14 KG/M2 | OXYGEN SATURATION: 98 % | HEIGHT: 69 IN | HEART RATE: 75 BPM | WEIGHT: 190 LBS

## 2025-01-23 DIAGNOSIS — R05.9 COUGH, UNSPECIFIED: ICD-10-CM

## 2025-01-23 DIAGNOSIS — R21 RASH AND OTHER NONSPECIFIC SKIN ERUPTION: ICD-10-CM

## 2025-01-23 PROCEDURE — 99214 OFFICE O/P EST MOD 30 MIN: CPT

## 2025-01-23 RX ORDER — TRIAMCINOLONE ACETONIDE 1 MG/G
0.1 CREAM TOPICAL TWICE DAILY
Qty: 1 | Refills: 0 | Status: ACTIVE | COMMUNITY
Start: 2025-01-23 | End: 1900-01-01

## 2025-03-25 ENCOUNTER — APPOINTMENT (OUTPATIENT)
Dept: PULMONOLOGY | Facility: CLINIC | Age: 85
End: 2025-03-25

## 2025-05-07 ENCOUNTER — NON-APPOINTMENT (OUTPATIENT)
Age: 85
End: 2025-05-07

## 2025-05-07 ENCOUNTER — APPOINTMENT (OUTPATIENT)
Dept: UROLOGY | Facility: CLINIC | Age: 85
End: 2025-05-07
Payer: MEDICARE

## 2025-05-07 VITALS
SYSTOLIC BLOOD PRESSURE: 124 MMHG | BODY MASS INDEX: 28.14 KG/M2 | HEART RATE: 67 BPM | WEIGHT: 190 LBS | HEIGHT: 69 IN | DIASTOLIC BLOOD PRESSURE: 77 MMHG

## 2025-05-07 DIAGNOSIS — N13.8 BENIGN PROSTATIC HYPERPLASIA WITH LOWER URINARY TRACT SYMPMS: ICD-10-CM

## 2025-05-07 DIAGNOSIS — R32 UNSPECIFIED URINARY INCONTINENCE: ICD-10-CM

## 2025-05-07 DIAGNOSIS — N40.1 BENIGN PROSTATIC HYPERPLASIA WITH LOWER URINARY TRACT SYMPMS: ICD-10-CM

## 2025-05-07 DIAGNOSIS — C61 MALIGNANT NEOPLASM OF PROSTATE: ICD-10-CM

## 2025-05-07 DIAGNOSIS — N28.1 CYST OF KIDNEY, ACQUIRED: ICD-10-CM

## 2025-05-07 PROCEDURE — 51798 US URINE CAPACITY MEASURE: CPT

## 2025-05-07 PROCEDURE — 99204 OFFICE O/P NEW MOD 45 MIN: CPT | Mod: 25

## 2025-05-08 LAB
APPEARANCE: CLEAR
BACTERIA: NEGATIVE /HPF
BILIRUBIN URINE: NEGATIVE
BLOOD URINE: NEGATIVE
CAST: 0 /LPF
COLOR: YELLOW
EPITHELIAL CELLS: 0 /HPF
GLUCOSE QUALITATIVE U: NEGATIVE MG/DL
KETONES URINE: ABNORMAL MG/DL
LEUKOCYTE ESTERASE URINE: ABNORMAL
MICROSCOPIC-UA: NORMAL
NITRITE URINE: NEGATIVE
PH URINE: 5.5
PROTEIN URINE: NEGATIVE MG/DL
RED BLOOD CELLS URINE: 0 /HPF
SPECIFIC GRAVITY URINE: 1.02
UROBILINOGEN URINE: 0.2 MG/DL
WHITE BLOOD CELLS URINE: 0 /HPF

## 2025-05-09 LAB — BACTERIA UR CULT: NORMAL

## 2025-05-14 ENCOUNTER — RX RENEWAL (OUTPATIENT)
Age: 85
End: 2025-05-14

## 2025-05-17 ENCOUNTER — NON-APPOINTMENT (OUTPATIENT)
Age: 85
End: 2025-05-17

## 2025-05-22 ENCOUNTER — APPOINTMENT (OUTPATIENT)
Dept: INTERNAL MEDICINE | Facility: CLINIC | Age: 85
End: 2025-05-22

## 2025-05-22 DIAGNOSIS — R32 UNSPECIFIED URINARY INCONTINENCE: ICD-10-CM

## 2025-05-22 RX ORDER — MIRABEGRON 25 MG/1
25 TABLET, EXTENDED RELEASE ORAL
Qty: 90 | Refills: 3 | Status: ACTIVE | COMMUNITY
Start: 2025-05-22 | End: 1900-01-01

## 2025-06-28 ENCOUNTER — RX RENEWAL (OUTPATIENT)
Age: 85
End: 2025-06-28

## 2025-07-02 ENCOUNTER — APPOINTMENT (OUTPATIENT)
Dept: UROLOGY | Facility: CLINIC | Age: 85
End: 2025-07-02
Payer: MEDICARE

## 2025-07-02 VITALS
HEIGHT: 69 IN | DIASTOLIC BLOOD PRESSURE: 74 MMHG | SYSTOLIC BLOOD PRESSURE: 131 MMHG | WEIGHT: 210 LBS | BODY MASS INDEX: 31.1 KG/M2 | HEART RATE: 72 BPM | OXYGEN SATURATION: 99 % | RESPIRATION RATE: 16 BRPM

## 2025-07-02 PROBLEM — N32.81 OVERACTIVE BLADDER: Status: ACTIVE | Noted: 2025-07-02

## 2025-07-02 PROCEDURE — G2211 COMPLEX E/M VISIT ADD ON: CPT

## 2025-07-02 PROCEDURE — 99214 OFFICE O/P EST MOD 30 MIN: CPT

## 2025-07-02 RX ORDER — MIRABEGRON 50 MG/1
50 TABLET, EXTENDED RELEASE ORAL
Qty: 90 | Refills: 2 | Status: ACTIVE | COMMUNITY
Start: 2025-07-02 | End: 1900-01-01

## 2025-07-23 ENCOUNTER — APPOINTMENT (OUTPATIENT)
Dept: INTERNAL MEDICINE | Facility: CLINIC | Age: 85
End: 2025-07-23
Payer: MEDICARE

## 2025-07-23 VITALS
OXYGEN SATURATION: 97 % | HEART RATE: 79 BPM | HEIGHT: 69 IN | WEIGHT: 201 LBS | RESPIRATION RATE: 14 BRPM | SYSTOLIC BLOOD PRESSURE: 120 MMHG | TEMPERATURE: 98 F | BODY MASS INDEX: 29.77 KG/M2 | DIASTOLIC BLOOD PRESSURE: 72 MMHG

## 2025-07-23 DIAGNOSIS — I10 ESSENTIAL (PRIMARY) HYPERTENSION: ICD-10-CM

## 2025-07-23 DIAGNOSIS — Z00.00 ENCOUNTER FOR GENERAL ADULT MEDICAL EXAMINATION W/OUT ABNORMAL FINDINGS: ICD-10-CM

## 2025-07-23 DIAGNOSIS — E78.5 HYPERLIPIDEMIA, UNSPECIFIED: ICD-10-CM

## 2025-07-23 DIAGNOSIS — I50.32 CHRONIC DIASTOLIC (CONGESTIVE) HEART FAILURE: ICD-10-CM

## 2025-07-23 DIAGNOSIS — C61 MALIGNANT NEOPLASM OF PROSTATE: ICD-10-CM

## 2025-07-23 DIAGNOSIS — F03.90 UNSPECIFIED DEMENTIA W/OUT BEHAVIORAL DISTURBANCE: ICD-10-CM

## 2025-07-23 DIAGNOSIS — D50.9 IRON DEFICIENCY ANEMIA, UNSPECIFIED: ICD-10-CM

## 2025-07-23 PROCEDURE — G0439: CPT

## 2025-07-23 PROCEDURE — 36415 COLL VENOUS BLD VENIPUNCTURE: CPT

## 2025-07-23 RX ORDER — OMEPRAZOLE 40 MG/1
40 CAPSULE, DELAYED RELEASE ORAL DAILY
Qty: 90 | Refills: 1 | Status: ACTIVE | COMMUNITY

## 2025-07-23 RX ORDER — AMITRIPTYLINE HYDROCHLORIDE 75 MG/1
TABLET, FILM COATED ORAL
Refills: 0 | Status: ACTIVE | COMMUNITY

## 2025-07-23 RX ORDER — DAPAGLIFLOZIN 5 MG/1
5 TABLET, FILM COATED ORAL DAILY
Qty: 90 | Refills: 0 | Status: ACTIVE | COMMUNITY

## 2025-07-23 RX ORDER — DIVALPROEX SODIUM 250 MG/1
250 TABLET, DELAYED RELEASE ORAL
Qty: 90 | Refills: 0 | Status: ACTIVE | COMMUNITY

## 2025-08-03 LAB
25(OH)D3 SERPL-MCNC: 43.3 NG/ML
ALBUMIN SERPL ELPH-MCNC: 4.3 G/DL
ALP BLD-CCNC: 35 U/L
ALT SERPL-CCNC: 12 U/L
ANION GAP SERPL CALC-SCNC: 14 MMOL/L
AST SERPL-CCNC: 16 U/L
BASOPHILS # BLD AUTO: 0.02 K/UL
BASOPHILS NFR BLD AUTO: 0.5 %
BILIRUB SERPL-MCNC: <0.2 MG/DL
BUN SERPL-MCNC: 26 MG/DL
CALCIUM SERPL-MCNC: 9 MG/DL
CHLORIDE SERPL-SCNC: 106 MMOL/L
CHOLEST SERPL-MCNC: 179 MG/DL
CO2 SERPL-SCNC: 21 MMOL/L
CREAT SERPL-MCNC: 1.51 MG/DL
EGFRCR SERPLBLD CKD-EPI 2021: 45 ML/MIN/1.73M2
EOSINOPHIL # BLD AUTO: 0.22 K/UL
EOSINOPHIL NFR BLD AUTO: 5.6 %
ESTIMATED AVERAGE GLUCOSE: 103 MG/DL
FERRITIN SERPL-MCNC: 191 NG/ML
GLUCOSE SERPL-MCNC: 114 MG/DL
HBA1C MFR BLD HPLC: 5.2 %
HCT VFR BLD CALC: 29.5 %
HDLC SERPL-MCNC: 61 MG/DL
HGB BLD-MCNC: 9.8 G/DL
IMM GRANULOCYTES NFR BLD AUTO: 0.5 %
IRON SATN MFR SERPL: 30 %
IRON SERPL-MCNC: 75 UG/DL
LDLC SERPL-MCNC: 87 MG/DL
LYMPHOCYTES # BLD AUTO: 0.7 K/UL
LYMPHOCYTES NFR BLD AUTO: 17.8 %
MAN DIFF?: NORMAL
MCHC RBC-ENTMCNC: 32.8 PG
MCHC RBC-ENTMCNC: 33.2 G/DL
MCV RBC AUTO: 98.7 FL
MONOCYTES # BLD AUTO: 0.24 K/UL
MONOCYTES NFR BLD AUTO: 6.1 %
NEUTROPHILS # BLD AUTO: 2.73 K/UL
NEUTROPHILS NFR BLD AUTO: 69.5 %
NONHDLC SERPL-MCNC: 119 MG/DL
PLATELET # BLD AUTO: 218 K/UL
POTASSIUM SERPL-SCNC: 4.3 MMOL/L
PROT SERPL-MCNC: 6.3 G/DL
PSA SERPL-MCNC: 0.03 NG/ML
RBC # BLD: 2.99 M/UL
RBC # FLD: 13.3 %
SODIUM SERPL-SCNC: 141 MMOL/L
TIBC SERPL-MCNC: 249 UG/DL
TRIGL SERPL-MCNC: 187 MG/DL
TSH SERPL-ACNC: 2.64 UIU/ML
UIBC SERPL-MCNC: 174 UG/DL
VIT B12 SERPL-MCNC: 619 PG/ML
WBC # FLD AUTO: 3.93 K/UL

## 2025-08-06 ENCOUNTER — APPOINTMENT (OUTPATIENT)
Dept: UROLOGY | Facility: CLINIC | Age: 85
End: 2025-08-06

## 2025-08-22 ENCOUNTER — RX RENEWAL (OUTPATIENT)
Age: 85
End: 2025-08-22